# Patient Record
Sex: FEMALE | Race: WHITE | Employment: OTHER | URBAN - METROPOLITAN AREA
[De-identification: names, ages, dates, MRNs, and addresses within clinical notes are randomized per-mention and may not be internally consistent; named-entity substitution may affect disease eponyms.]

---

## 2023-04-01 ENCOUNTER — HOSPITAL ENCOUNTER (OUTPATIENT)
Age: 68
Setting detail: OBSERVATION
Discharge: HOME | End: 2023-04-04
Attending: EMERGENCY MEDICINE | Admitting: INTERNAL MEDICINE
Payer: MEDICARE

## 2023-04-01 ENCOUNTER — APPOINTMENT (OUTPATIENT)
Dept: GENERAL RADIOLOGY | Age: 68
End: 2023-04-01
Payer: MEDICARE

## 2023-04-01 DIAGNOSIS — I77.6 VASCULITIS (HCC): Primary | ICD-10-CM

## 2023-04-01 LAB
ALBUMIN SERPL-MCNC: 3.7 G/DL (ref 3.4–5)
ALBUMIN/GLOB SERPL: 0.9 {RATIO} (ref 1.1–2.2)
ALP SERPL-CCNC: 75 U/L (ref 40–129)
ALT SERPL-CCNC: 11 U/L (ref 10–40)
ANION GAP SERPL CALCULATED.3IONS-SCNC: 12 MMOL/L (ref 3–16)
AST SERPL-CCNC: 16 U/L (ref 15–37)
BASOPHILS # BLD: 0 K/UL (ref 0–0.2)
BASOPHILS NFR BLD: 0.5 %
BILIRUB SERPL-MCNC: 0.3 MG/DL (ref 0–1)
BUN SERPL-MCNC: 17 MG/DL (ref 7–20)
CALCIUM SERPL-MCNC: 9.1 MG/DL (ref 8.3–10.6)
CHLORIDE SERPL-SCNC: 105 MMOL/L (ref 99–110)
CO2 SERPL-SCNC: 22 MMOL/L (ref 21–32)
CREAT SERPL-MCNC: 0.8 MG/DL (ref 0.6–1.2)
DEPRECATED RDW RBC AUTO: 15.5 % (ref 12.4–15.4)
EOSINOPHIL # BLD: 0 K/UL (ref 0–0.6)
EOSINOPHIL NFR BLD: 0.1 %
GFR SERPLBLD CREATININE-BSD FMLA CKD-EPI: >60 ML/MIN/{1.73_M2}
GLUCOSE SERPL-MCNC: 141 MG/DL (ref 70–99)
HCT VFR BLD AUTO: 41.1 % (ref 36–48)
HGB BLD-MCNC: 13.4 G/DL (ref 12–16)
INR PPP: 0.94 (ref 0.84–1.16)
LACTATE BLDV-SCNC: 1.5 MMOL/L (ref 0.4–1.9)
LYMPHOCYTES # BLD: 0.7 K/UL (ref 1–5.1)
LYMPHOCYTES NFR BLD: 6.9 %
MCH RBC QN AUTO: 28.1 PG (ref 26–34)
MCHC RBC AUTO-ENTMCNC: 32.6 G/DL (ref 31–36)
MCV RBC AUTO: 86.1 FL (ref 80–100)
MONOCYTES # BLD: 0.1 K/UL (ref 0–1.3)
MONOCYTES NFR BLD: 1.1 %
NEUTROPHILS # BLD: 8.9 K/UL (ref 1.7–7.7)
NEUTROPHILS NFR BLD: 91.4 %
PLATELET # BLD AUTO: 179 K/UL (ref 135–450)
PMV BLD AUTO: 9.5 FL (ref 5–10.5)
POTASSIUM SERPL-SCNC: 4.2 MMOL/L (ref 3.5–5.1)
PROT SERPL-MCNC: 7.6 G/DL (ref 6.4–8.2)
PROTHROMBIN TIME: 12.6 SEC (ref 11.5–14.8)
RBC # BLD AUTO: 4.77 M/UL (ref 4–5.2)
SODIUM SERPL-SCNC: 139 MMOL/L (ref 136–145)
WBC # BLD AUTO: 9.7 K/UL (ref 4–11)

## 2023-04-01 PROCEDURE — 80053 COMPREHEN METABOLIC PANEL: CPT

## 2023-04-01 PROCEDURE — 85025 COMPLETE CBC W/AUTO DIFF WBC: CPT

## 2023-04-01 PROCEDURE — 99285 EMERGENCY DEPT VISIT HI MDM: CPT

## 2023-04-01 PROCEDURE — 96365 THER/PROPH/DIAG IV INF INIT: CPT

## 2023-04-01 PROCEDURE — 6360000002 HC RX W HCPCS: Performed by: PHYSICIAN ASSISTANT

## 2023-04-01 PROCEDURE — 85652 RBC SED RATE AUTOMATED: CPT

## 2023-04-01 PROCEDURE — 2580000003 HC RX 258: Performed by: PHYSICIAN ASSISTANT

## 2023-04-01 PROCEDURE — 83605 ASSAY OF LACTIC ACID: CPT

## 2023-04-01 PROCEDURE — 96375 TX/PRO/DX INJ NEW DRUG ADDON: CPT

## 2023-04-01 PROCEDURE — 87040 BLOOD CULTURE FOR BACTERIA: CPT

## 2023-04-01 PROCEDURE — 86140 C-REACTIVE PROTEIN: CPT

## 2023-04-01 PROCEDURE — 73590 X-RAY EXAM OF LOWER LEG: CPT

## 2023-04-01 PROCEDURE — 85610 PROTHROMBIN TIME: CPT

## 2023-04-01 RX ORDER — METHYLPREDNISOLONE SODIUM SUCCINATE 125 MG/2ML
125 INJECTION, POWDER, LYOPHILIZED, FOR SOLUTION INTRAMUSCULAR; INTRAVENOUS ONCE
Status: COMPLETED | OUTPATIENT
Start: 2023-04-01 | End: 2023-04-01

## 2023-04-01 RX ADMIN — PIPERACILLIN AND TAZOBACTAM 4500 MG: 4; .5 INJECTION, POWDER, FOR SOLUTION INTRAVENOUS at 23:29

## 2023-04-01 RX ADMIN — METHYLPREDNISOLONE SODIUM SUCCINATE 125 MG: 125 INJECTION, POWDER, FOR SOLUTION INTRAMUSCULAR; INTRAVENOUS at 23:50

## 2023-04-01 ASSESSMENT — ENCOUNTER SYMPTOMS
CONSTIPATION: 0
BACK PAIN: 0
SORE THROAT: 0
SHORTNESS OF BREATH: 0
EYE REDNESS: 0
DIARRHEA: 0
COUGH: 0
NAUSEA: 0
VOMITING: 0
ABDOMINAL PAIN: 0
EYE PAIN: 0

## 2023-04-01 ASSESSMENT — LIFESTYLE VARIABLES
HOW MANY STANDARD DRINKS CONTAINING ALCOHOL DO YOU HAVE ON A TYPICAL DAY: PATIENT DOES NOT DRINK
HOW OFTEN DO YOU HAVE A DRINK CONTAINING ALCOHOL: NEVER

## 2023-04-01 ASSESSMENT — PAIN - FUNCTIONAL ASSESSMENT: PAIN_FUNCTIONAL_ASSESSMENT: 0-10

## 2023-04-01 ASSESSMENT — PAIN DESCRIPTION - PAIN TYPE: TYPE: ACUTE PAIN

## 2023-04-01 ASSESSMENT — PAIN DESCRIPTION - LOCATION: LOCATION: LEG

## 2023-04-01 ASSESSMENT — PAIN DESCRIPTION - ORIENTATION: ORIENTATION: RIGHT;LEFT

## 2023-04-01 ASSESSMENT — PAIN SCALES - GENERAL: PAINLEVEL_OUTOF10: 8

## 2023-04-02 LAB
CRP SERPL-MCNC: 11.1 MG/L (ref 0–5.1)
ERYTHROCYTE [SEDIMENTATION RATE] IN BLOOD BY WESTERGREN METHOD: 58 MM/HR (ref 0–30)

## 2023-04-02 PROCEDURE — 96366 THER/PROPH/DIAG IV INF ADDON: CPT

## 2023-04-02 PROCEDURE — 6360000002 HC RX W HCPCS: Performed by: PHYSICIAN ASSISTANT

## 2023-04-02 PROCEDURE — 96368 THER/DIAG CONCURRENT INF: CPT

## 2023-04-02 PROCEDURE — 2580000003 HC RX 258: Performed by: PHYSICIAN ASSISTANT

## 2023-04-02 RX ADMIN — VANCOMYCIN HYDROCHLORIDE 1500 MG: 1.5 INJECTION, POWDER, LYOPHILIZED, FOR SOLUTION INTRAVENOUS at 00:30

## 2023-04-02 NOTE — ED NOTES
Son at bedside. Poc discussed with pt and son, son translated for pt. Pt able to communicate with little english.      Emil Mcdermott RN  04/02/23 4754

## 2023-04-02 NOTE — ED NOTES
Providers attempting to admit pt inhouse until transfer made possible. Awaiting decision.       Collins Queen RN  04/02/23 3936

## 2023-04-02 NOTE — ED NOTES
Looked in the patients encounter and saw that it states that UC is still waiting for a bed to become available.       [facility] RAQUEL  04/02/23 2281

## 2023-04-02 NOTE — ED TRIAGE NOTES
Pt arrived in department for a c/c of leg pain. Pt states that she feels that her skin is getting tighter in both legs. Pt states that left leg is worse. Pt states that this started today. Pt states that she feels that she has an infection in both her legs. No specific diagnosis at this time. Pt rates pain 8/10 currently. VS noted and stable except bp (149/85). Patient A&Ox4. Respirations easy and unlabored. Skin warm and dry and appropriate for ethnicity. No acute distress noted at this time. Patient placed on continuous telemetry and pulse ox upon arrival to room.

## 2023-04-02 NOTE — ED NOTES
Pt spo2 dropped to 86% while sleeping.  She is placed on 1L o2 nc.     Stan Briggs, ZEE  04/02/23 1600

## 2023-04-02 NOTE — ED NOTES
Placed call to access center for a UC update. Spoke with Silva RN, she reached out to . Their response was that they only had half the discharges they thought they were going to have. They are still working on a bed for the patient in hopes of having one tonight but they can not guarantee that. RN and charge RN made aware.       [facility] RAQUEL  04/02/23 5769

## 2023-04-02 NOTE — ED NOTES
Placed call to access center for a update, as there was no new update in there since 0900 this morning. Spoke with RN at the center and she reached out to RN at [facility] and she states that they are still working on a bed for this patient. Any new update they rika call us back. RN and Charge RN made aware.       [facility]  04/02/23 9489

## 2023-04-02 NOTE — ED NOTES
Pt provided with another pillow and blanket for comfort. Updated on poc. Awaiting room and transport info for pt transfer. Pt trying to take a nap.       Stan Briggs RN  04/02/23 4027

## 2023-04-02 NOTE — ED NOTES
Son left for work, number for contact and to update on transport at bedside.      Emil Mcdermott, ZEE  04/02/23 5447

## 2023-04-03 PROBLEM — M79.606 LEG PAIN: Status: ACTIVE | Noted: 2023-04-03

## 2023-04-03 LAB
ANION GAP SERPL CALCULATED.3IONS-SCNC: 11 MMOL/L (ref 3–16)
BUN SERPL-MCNC: 20 MG/DL (ref 7–20)
CALCIUM SERPL-MCNC: 8.5 MG/DL (ref 8.3–10.6)
CHLORIDE SERPL-SCNC: 108 MMOL/L (ref 99–110)
CO2 SERPL-SCNC: 24 MMOL/L (ref 21–32)
CREAT SERPL-MCNC: 1.1 MG/DL (ref 0.6–1.2)
CRP SERPL-MCNC: 5.9 MG/L (ref 0–5.1)
DEPRECATED RDW RBC AUTO: 16 % (ref 12.4–15.4)
ERYTHROCYTE [SEDIMENTATION RATE] IN BLOOD BY WESTERGREN METHOD: 48 MM/HR (ref 0–30)
GFR SERPLBLD CREATININE-BSD FMLA CKD-EPI: 56 ML/MIN/{1.73_M2}
GLUCOSE SERPL-MCNC: 89 MG/DL (ref 70–99)
HCT VFR BLD AUTO: 42.1 % (ref 36–48)
HGB BLD-MCNC: 13.5 G/DL (ref 12–16)
MAGNESIUM SERPL-MCNC: 2.1 MG/DL (ref 1.8–2.4)
MCH RBC QN AUTO: 28 PG (ref 26–34)
MCHC RBC AUTO-ENTMCNC: 32 G/DL (ref 31–36)
MCV RBC AUTO: 87.6 FL (ref 80–100)
PLATELET # BLD AUTO: 181 K/UL (ref 135–450)
PMV BLD AUTO: 10.2 FL (ref 5–10.5)
POTASSIUM SERPL-SCNC: 3.5 MMOL/L (ref 3.5–5.1)
RBC # BLD AUTO: 4.8 M/UL (ref 4–5.2)
SODIUM SERPL-SCNC: 143 MMOL/L (ref 136–145)
WBC # BLD AUTO: 10.3 K/UL (ref 4–11)

## 2023-04-03 PROCEDURE — 6360000002 HC RX W HCPCS: Performed by: INTERNAL MEDICINE

## 2023-04-03 PROCEDURE — G0378 HOSPITAL OBSERVATION PER HR: HCPCS

## 2023-04-03 PROCEDURE — 6370000000 HC RX 637 (ALT 250 FOR IP): Performed by: INTERNAL MEDICINE

## 2023-04-03 PROCEDURE — 83735 ASSAY OF MAGNESIUM: CPT

## 2023-04-03 PROCEDURE — 80048 BASIC METABOLIC PNL TOTAL CA: CPT

## 2023-04-03 PROCEDURE — 96372 THER/PROPH/DIAG INJ SC/IM: CPT

## 2023-04-03 PROCEDURE — 85027 COMPLETE CBC AUTOMATED: CPT

## 2023-04-03 PROCEDURE — 2580000003 HC RX 258: Performed by: INTERNAL MEDICINE

## 2023-04-03 PROCEDURE — 85652 RBC SED RATE AUTOMATED: CPT

## 2023-04-03 PROCEDURE — 86140 C-REACTIVE PROTEIN: CPT

## 2023-04-03 PROCEDURE — 36415 COLL VENOUS BLD VENIPUNCTURE: CPT

## 2023-04-03 RX ORDER — ACETAMINOPHEN 325 MG/1
650 TABLET ORAL EVERY 6 HOURS PRN
Status: DISCONTINUED | OUTPATIENT
Start: 2023-04-03 | End: 2023-04-04 | Stop reason: HOSPADM

## 2023-04-03 RX ORDER — SODIUM CHLORIDE 0.9 % (FLUSH) 0.9 %
10 SYRINGE (ML) INJECTION PRN
Status: DISCONTINUED | OUTPATIENT
Start: 2023-04-03 | End: 2023-04-04 | Stop reason: HOSPADM

## 2023-04-03 RX ORDER — MAGNESIUM SULFATE IN WATER 40 MG/ML
2000 INJECTION, SOLUTION INTRAVENOUS PRN
Status: DISCONTINUED | OUTPATIENT
Start: 2023-04-03 | End: 2023-04-04 | Stop reason: HOSPADM

## 2023-04-03 RX ORDER — POTASSIUM CHLORIDE 20 MEQ/1
40 TABLET, EXTENDED RELEASE ORAL PRN
Status: DISCONTINUED | OUTPATIENT
Start: 2023-04-03 | End: 2023-04-04 | Stop reason: HOSPADM

## 2023-04-03 RX ORDER — SODIUM CHLORIDE 9 MG/ML
INJECTION, SOLUTION INTRAVENOUS PRN
Status: DISCONTINUED | OUTPATIENT
Start: 2023-04-03 | End: 2023-04-04 | Stop reason: HOSPADM

## 2023-04-03 RX ORDER — PROMETHAZINE HYDROCHLORIDE 25 MG/1
12.5 TABLET ORAL EVERY 6 HOURS PRN
Status: DISCONTINUED | OUTPATIENT
Start: 2023-04-03 | End: 2023-04-04 | Stop reason: HOSPADM

## 2023-04-03 RX ORDER — POTASSIUM CHLORIDE 7.45 MG/ML
10 INJECTION INTRAVENOUS PRN
Status: DISCONTINUED | OUTPATIENT
Start: 2023-04-03 | End: 2023-04-04 | Stop reason: HOSPADM

## 2023-04-03 RX ORDER — ENOXAPARIN SODIUM 100 MG/ML
40 INJECTION SUBCUTANEOUS DAILY
Status: DISCONTINUED | OUTPATIENT
Start: 2023-04-03 | End: 2023-04-04 | Stop reason: HOSPADM

## 2023-04-03 RX ORDER — HYDROCODONE BITARTRATE AND ACETAMINOPHEN 7.5; 325 MG/1; MG/1
1 TABLET ORAL EVERY 6 HOURS PRN
Status: DISCONTINUED | OUTPATIENT
Start: 2023-04-03 | End: 2023-04-04 | Stop reason: HOSPADM

## 2023-04-03 RX ORDER — ONDANSETRON 2 MG/ML
4 INJECTION INTRAMUSCULAR; INTRAVENOUS EVERY 6 HOURS PRN
Status: DISCONTINUED | OUTPATIENT
Start: 2023-04-03 | End: 2023-04-04 | Stop reason: HOSPADM

## 2023-04-03 RX ORDER — SODIUM CHLORIDE 0.9 % (FLUSH) 0.9 %
10 SYRINGE (ML) INJECTION EVERY 12 HOURS SCHEDULED
Status: DISCONTINUED | OUTPATIENT
Start: 2023-04-03 | End: 2023-04-04 | Stop reason: HOSPADM

## 2023-04-03 RX ORDER — ACETAMINOPHEN 650 MG/1
650 SUPPOSITORY RECTAL EVERY 6 HOURS PRN
Status: DISCONTINUED | OUTPATIENT
Start: 2023-04-03 | End: 2023-04-04 | Stop reason: HOSPADM

## 2023-04-03 RX ADMIN — ENOXAPARIN SODIUM 40 MG: 100 INJECTION SUBCUTANEOUS at 18:35

## 2023-04-03 RX ADMIN — SODIUM CHLORIDE, PRESERVATIVE FREE 10 ML: 5 INJECTION INTRAVENOUS at 21:25

## 2023-04-03 RX ADMIN — HYDROCODONE BITARTRATE AND ACETAMINOPHEN 1 TABLET: 7.5; 325 TABLET ORAL at 18:35

## 2023-04-03 ASSESSMENT — PAIN DESCRIPTION - DESCRIPTORS: DESCRIPTORS: DISCOMFORT

## 2023-04-03 ASSESSMENT — PAIN SCALES - WONG BAKER: WONGBAKER_NUMERICALRESPONSE: 0

## 2023-04-03 ASSESSMENT — PAIN SCALES - GENERAL
PAINLEVEL_OUTOF10: 5
PAINLEVEL_OUTOF10: 0

## 2023-04-03 ASSESSMENT — PAIN - FUNCTIONAL ASSESSMENT: PAIN_FUNCTIONAL_ASSESSMENT: ACTIVITIES ARE NOT PREVENTED

## 2023-04-03 ASSESSMENT — PAIN DESCRIPTION - LOCATION: LOCATION: LEG

## 2023-04-03 ASSESSMENT — PAIN DESCRIPTION - ORIENTATION: ORIENTATION: LEFT

## 2023-04-03 NOTE — PLAN OF CARE
Problem: Discharge Planning  Goal: Discharge to home or other facility with appropriate resources  Outcome: Progressing  Flowsheets (Taken 4/3/2023 1814)  Discharge to home or other facility with appropriate resources:   Identify barriers to discharge with patient and caregiver   Arrange for needed discharge resources and transportation as appropriate   Identify discharge learning needs (meds, wound care, etc)   Arrange for interpreters to assist at discharge as needed     Problem: Pain  Goal: Verbalizes/displays adequate comfort level or baseline comfort level  Outcome: Progressing     Problem: ABCDS Injury Assessment  Goal: Absence of physical injury  Outcome: Progressing

## 2023-04-03 NOTE — ED NOTES
Patient is comfortable visiting with family. No distress noted; still waiting on decision.      Rula Kenny RN  04/02/23 4815

## 2023-04-03 NOTE — ED NOTES
Patient resting comfortable on left side, pillow under head and no pain at this time     Gabriele Gee RN  04/03/23 0858

## 2023-04-03 NOTE — CARE COORDINATION
SW confirmed PCP-  [facility]   Velia Elise NP   Phone # [phone]  Fax # [phone]    Main # for [facility] [phone]

## 2023-04-03 NOTE — ED PROVIDER NOTES
2230: Patient's status. We have checked multiple times throughout the afternoon and evening. Still no bed availability at the [facility]. She has been accepted as documented in her ED chart. They had less than half of the anticipated discharges and still have no bed and likely will not have a bed tonight. I did speak with Dr. Komal Horan on 6:30 PM.  He requested that we wait a few more hours and reassess her bed status and if still no bed available we could talk with the night shift hospitalist.  At 24 hours she still did not have a bed available. I consulted the night shift hospitalist, Dr. Flaco Rosa. He still felt that the patient required transfer to , he felt that we could not provide the services she needs here, rheumatology. He felt that admitting her here would delay getting a bed at [facility]. I have made the medical director, Dr. Roe Meier aware of the ongoing hold in the ED while waiting for a bed to be available at [facility]. The patient was reassessed using an  and has no needs at this time. Her vital signs are stable. Leilani Dennison MD  04/02/23 2232      1150: Still no bed anticipated at  anytime this afternoon. I was contacted by our medical director, Dr. Roe Meier. He has been in communication with the hospitalist, Dr. Komal Horan. Per his request I will Perfect[employer]erve the hospitalist currently on duty to admit the patient at our facility pending bed availability at the [facility].      Leilani Dennison MD  04/03/23 7178
Seen and examined discussed with MLP agree with plan. Briefly this is a [age]year-old  female who comes in with a day history of progressive left lower extremity edema and rash. She is afebrile she has normal vital signs however she has pain and a nonblanchable petechial rash in the left lower extremity. She has good pulses. I am concerned with a cellulitis type process.      Rick Díaz MD  04/01/23 0822
heart sounds. No murmur heard. No gallop. Pulmonary:      Effort: Pulmonary effort is normal. No respiratory distress. Breath sounds: Normal breath sounds. No stridor. No wheezing, rhonchi or rales. Musculoskeletal:         General: Normal range of motion. Cervical back: Normal range of motion. Comments: DP pulses 2+ bilaterally     Skin:     General: Skin is warm and dry. Findings: Petechiae and rash present. Rash is purpuric. Comments: No blanching   Neurological:      General: No focal deficit present. Mental Status: She is alert and oriented to person, place, and time. Psychiatric:         Mood and Affect: Mood normal.         Behavior: Behavior normal.                 DIAGNOSTIC RESULTS   LABS:    Labs Reviewed   CBC WITH AUTO DIFFERENTIAL - Abnormal; Notable for the following components:       Result Value    RDW 15.5 (*)     Neutrophils Absolute 8.9 (*)     Lymphocytes Absolute 0.7 (*)     All other components within normal limits   COMPREHENSIVE METABOLIC PANEL W/ REFLEX TO MG FOR LOW K - Abnormal; Notable for the following components:    Glucose 141 (*)     Albumin/Globulin Ratio 0.9 (*)     All other components within normal limits   C-REACTIVE PROTEIN - Abnormal; Notable for the following components:    CRP 11.1 (*)     All other components within normal limits   SEDIMENTATION RATE - Abnormal; Notable for the following components:    Sed Rate 58 (*)     All other components within normal limits   CULTURE, BLOOD 1   CULTURE, BLOOD 2   LACTATE, SEPSIS   PROTIME-INR   LACTATE, SEPSIS       When ordered only abnormal lab results are displayed. All other labs were within normal range or not returned as of this dictation. EKG: When ordered, EKG's are interpreted by the Emergency Department Physician in the absence of a cardiologist.  Please see their note for interpretation of EKG.     RADIOLOGY:   Non-plain film images such as CT, Ultrasound and MRI are read by the

## 2023-04-03 NOTE — ED NOTES
Patient resting comfortable. No distress noted.  Unlabored breathin,bed in lowest position, and call light within reach     Les Holt RN  04/03/23 6089

## 2023-04-03 NOTE — ED NOTES
Pt's family has arrived with food and drinks. Requesting an update on care plan.           Meg Rodriguez RN  04/03/23 6709

## 2023-04-03 NOTE — CARE COORDINATION
Case Management Assessment  Initial Evaluation    Date/Time of Evaluation: 4/3/2023 1:40 PM  Assessment Completed by: TANNER Fischer    If patient is discharged prior to next notation, then this note serves as note for discharge by case management. Patient Name: Sivakumar Lizarraga                   YOB: 1955  Diagnosis: No admission diagnoses are documented for this encounter. Date / Time: 4/1/2023  9:31 PM    Patient Admission Status: Emergency   Readmission Risk (Low < 19, Mod (19-27), High > 27): No data recorded  Current PCP: DEYANIRA Frye CNP  PCP verified by CM? Yes    Chart Reviewed: Yes      History Provided by: Patient, Child/Family  Patient Orientation: Alert and Oriented    Patient Cognition: Alert    Hospitalization in the last 30 days (Readmission):  No    If yes, Readmission Assessment in CM Navigator will be completed. Advance Directives:      Code Status: Not on file   Patient's Primary Decision Maker is: Legal Next of Kin    Primary Decision Maker: Ciarra Aragon - Child - [phone]    Secondary Decision Maker: Anna Branch - Child - [phone]    Discharge Planning:    Patient lives with: Children Type of Home:    Primary Care Giver: Self  Patient Support Systems include: Family Members   Current Financial resources:    Current community resources:    Current services prior to admission:              Current DME:              Type of Home Care services:       ADLS  Prior functional level: Independent in ADLs/IADLs  Current functional level: Independent in ADLs/IADLs    PT AM-PAC:   /24  OT AM-PAC:   /24    Family can provide assistance at DC: Yes  Would you like Case Management to discuss the discharge plan with any other family members/significant others, and if so, who?  Yes (son or daughter)  Plans to Return to Present Housing: Yes  Other Identified Issues/Barriers to RETURNING to current housing: None  Potential Assistance needed

## 2023-04-03 NOTE — ACP (ADVANCE CARE PLANNING)
Advance Care Planning     Advance Care Planning Activator (Inpatient)  Conversation Note      Date of ACP Conversation: 4/3/2023     Conversation Conducted with: Patient with Decision Making Capacity    ACP Activator: Nuzhat Yamileth, [facility] Maker:     Current Designated Health Care Decision Maker:     Primary Decision Maker: Erica Sotelo Child - [phone]    Secondary Decision Maker: Joshua Constantino Child - [phone]    Today we documented Decision Maker(s) consistent with Legal Next of Kin hierarchy. Care Preferences    Ventilation: \"If you were in your present state of health and suddenly became very ill and were unable to breathe on your own, what would your preference be about the use of a ventilator (breathing machine) if it were available to you? \"      Would the patient desire the use of ventilator (breathing machine)?: yes    \"If your health worsens and it becomes clear that your chance of recovery is unlikely, what would your preference be about the use of a ventilator (breathing machine) if it were available to you? \"     Would the patient desire the use of ventilator (breathing machine)?: No      Resuscitation  \"CPR works best to restart the heart when there is a sudden event, like a heart attack, in someone who is otherwise healthy. Unfortunately, CPR does not typically restart the heart for people who have serious health conditions or who are very sick. \"    \"In the event your heart stopped as a result of an underlying serious health condition, would you want attempts to be made to restart your heart (answer \"yes\" for attempt to resuscitate) or would you prefer a natural death (answer \"no\" for do not attempt to resuscitate)? \" yes       [] Yes   [] No   Educated Patient / Willye Pair regarding differences between Advance Directives and portable DNR orders.     Length of ACP Conversation in minutes:      Conversation Outcomes:  ACP discussion

## 2023-04-03 NOTE — H&P
Hospital Medicine History & Physical      PCP: DEYANIRA Frye - CNP    Date of Admission: 4/1/2023    Chief Complaint:    Chief Complaint   Patient presents with    Other     Pt states that she feels that her skin is getting tighter in both legs. Pt states that left leg is worse. Pt states that this started today. Pt states that she feels that she has an infection in both her legs. No specific diagnosis at this time,        History Of Present Illness:    Patient is a 47-year-old female who presented to hospital for left lower extremity rash and swelling. Patient mentions that this is painless however it was getting worse, she also has as noticed it is started all of a sudden, It was getting it was getting worse, she was concerned she was concerned if if it was infection. Patient denied fever chills diarrhea constipation dysuria. Past Medical History:          Diagnosis Date    IFG (impaired fasting glucose)     Shoulder pain, right     Varicose vein of leg 7/23/2015       Past Surgical History:      History reviewed. No pertinent surgical history. Medications Prior to Admission:      Prior to Admission medications    Not on File       Allergies:  Patient has no known allergies. Social History:      TOBACCO:   reports that she has never smoked. She does not have any smokeless tobacco history on file. ETOH:   reports no history of alcohol use. Family History:       Reviewed in detail and non contributory      History reviewed. No pertinent family history. REVIEW OF SYSTEMS:   Pertinent positives as noted in the HPI. All other systems reviewed and negative. PHYSICAL EXAM PERFORMED:    /60   Pulse 66   Temp 98.2 °F (36.8 °C) (Oral)   Resp 19   Ht 5' 5\" (1.651 m)   Wt 195 lb 5.2 oz (88.6 kg)   SpO2 96%   BMI 32.50 kg/m²     General appearance:  No apparent distress, cooperative. HEENT:  Normal cephalic, atraumatic without obvious deformity.  Conjunctivae/corneas

## 2023-04-03 NOTE — ED NOTES
Kallie Lee from [facility] just called to update that UC is still discharge dependent and looking for bed placement. They were unable to give her an ETA of when this patient will likely get a bed.       Nicola Arguello, ZEE  04/02/23 7908

## 2023-04-03 NOTE — ED NOTES
This RN to bedside to update patient via [place] language interpret. Patient declines being moved into a regular hospital at this time. She says she would like to stay in the stretcher overnight and is hoping that a bed becomes available early tomorrow. Patient instructed to use her call light if she needs anything throughout the night. Patient verbalized understanding.       Carol Rayo RN  04/02/23 4630

## 2023-04-04 VITALS
HEART RATE: 80 BPM | OXYGEN SATURATION: 97 % | BODY MASS INDEX: 36.22 KG/M2 | SYSTOLIC BLOOD PRESSURE: 146 MMHG | DIASTOLIC BLOOD PRESSURE: 90 MMHG | WEIGHT: 217.37 LBS | HEIGHT: 65 IN | RESPIRATION RATE: 15 BRPM | TEMPERATURE: 97.2 F

## 2023-04-04 LAB
BACTERIA URNS QL MICRO: ABNORMAL /HPF
BASOPHILS # BLD: 0.1 K/UL (ref 0–0.2)
BASOPHILS NFR BLD: 1 %
BILIRUB UR QL STRIP.AUTO: NEGATIVE
CLARITY UR: CLEAR
COLOR UR: YELLOW
DEPRECATED RDW RBC AUTO: 15.7 % (ref 12.4–15.4)
EOSINOPHIL # BLD: 0.3 K/UL (ref 0–0.6)
EOSINOPHIL NFR BLD: 3.3 %
EPI CELLS #/AREA URNS AUTO: 3 /HPF (ref 0–5)
GLUCOSE UR STRIP.AUTO-MCNC: NEGATIVE MG/DL
HCT VFR BLD AUTO: 38.5 % (ref 36–48)
HGB BLD-MCNC: 12.7 G/DL (ref 12–16)
HGB UR QL STRIP.AUTO: NEGATIVE
HYALINE CASTS #/AREA URNS AUTO: 0 /LPF (ref 0–8)
KETONES UR STRIP.AUTO-MCNC: NEGATIVE MG/DL
LEUKOCYTE ESTERASE UR QL STRIP.AUTO: ABNORMAL
LYMPHOCYTES # BLD: 3 K/UL (ref 1–5.1)
LYMPHOCYTES NFR BLD: 39 %
MCH RBC QN AUTO: 28.6 PG (ref 26–34)
MCHC RBC AUTO-ENTMCNC: 33.1 G/DL (ref 31–36)
MCV RBC AUTO: 86.4 FL (ref 80–100)
MONOCYTES # BLD: 0.6 K/UL (ref 0–1.3)
MONOCYTES NFR BLD: 7.5 %
NEUTROPHILS # BLD: 3.8 K/UL (ref 1.7–7.7)
NEUTROPHILS NFR BLD: 49.2 %
NITRITE UR QL STRIP.AUTO: POSITIVE
PH UR STRIP.AUTO: 5.5 [PH] (ref 5–8)
PLATELET # BLD AUTO: 175 K/UL (ref 135–450)
PMV BLD AUTO: 9.7 FL (ref 5–10.5)
PROT UR STRIP.AUTO-MCNC: NEGATIVE MG/DL
RBC # BLD AUTO: 4.45 M/UL (ref 4–5.2)
RBC CLUMPS #/AREA URNS AUTO: 1 /HPF (ref 0–4)
SP GR UR STRIP.AUTO: 1.02 (ref 1–1.03)
UA DIPSTICK W REFLEX MICRO PNL UR: YES
URN SPEC COLLECT METH UR: ABNORMAL
UROBILINOGEN UR STRIP-ACNC: 0.2 E.U./DL
WBC # BLD AUTO: 7.7 K/UL (ref 4–11)
WBC #/AREA URNS AUTO: 18 /HPF (ref 0–5)

## 2023-04-04 PROCEDURE — 87088 URINE BACTERIA CULTURE: CPT

## 2023-04-04 PROCEDURE — 97165 OT EVAL LOW COMPLEX 30 MIN: CPT

## 2023-04-04 PROCEDURE — 99221 1ST HOSP IP/OBS SF/LOW 40: CPT | Performed by: STUDENT IN AN ORGANIZED HEALTH CARE EDUCATION/TRAINING PROGRAM

## 2023-04-04 PROCEDURE — 85025 COMPLETE CBC W/AUTO DIFF WBC: CPT

## 2023-04-04 PROCEDURE — 2580000003 HC RX 258: Performed by: INTERNAL MEDICINE

## 2023-04-04 PROCEDURE — 6360000002 HC RX W HCPCS: Performed by: INTERNAL MEDICINE

## 2023-04-04 PROCEDURE — 81001 URINALYSIS AUTO W/SCOPE: CPT

## 2023-04-04 PROCEDURE — G0378 HOSPITAL OBSERVATION PER HR: HCPCS

## 2023-04-04 PROCEDURE — 93970 EXTREMITY STUDY: CPT

## 2023-04-04 PROCEDURE — 87086 URINE CULTURE/COLONY COUNT: CPT

## 2023-04-04 PROCEDURE — 36415 COLL VENOUS BLD VENIPUNCTURE: CPT

## 2023-04-04 PROCEDURE — 87186 SC STD MICRODIL/AGAR DIL: CPT

## 2023-04-04 PROCEDURE — 6370000000 HC RX 637 (ALT 250 FOR IP): Performed by: INTERNAL MEDICINE

## 2023-04-04 PROCEDURE — 97161 PT EVAL LOW COMPLEX 20 MIN: CPT

## 2023-04-04 PROCEDURE — 94760 N-INVAS EAR/PLS OXIMETRY 1: CPT

## 2023-04-04 PROCEDURE — 96372 THER/PROPH/DIAG INJ SC/IM: CPT

## 2023-04-04 RX ORDER — PREDNISONE 20 MG/1
40 TABLET ORAL DAILY
Qty: 6 TABLET | Refills: 0 | Status: SHIPPED | OUTPATIENT
Start: 2023-04-05 | End: 2023-04-08

## 2023-04-04 RX ORDER — PREDNISONE 20 MG/1
40 TABLET ORAL DAILY
Status: DISCONTINUED | OUTPATIENT
Start: 2023-04-04 | End: 2023-04-04 | Stop reason: HOSPADM

## 2023-04-04 RX ADMIN — ENOXAPARIN SODIUM 40 MG: 100 INJECTION SUBCUTANEOUS at 09:54

## 2023-04-04 RX ADMIN — SODIUM CHLORIDE, PRESERVATIVE FREE 10 ML: 5 INJECTION INTRAVENOUS at 10:12

## 2023-04-04 RX ADMIN — PREDNISONE 40 MG: 20 TABLET ORAL at 16:28

## 2023-04-04 ASSESSMENT — ENCOUNTER SYMPTOMS
RESPIRATORY NEGATIVE: 1
GASTROINTESTINAL NEGATIVE: 1

## 2023-04-04 NOTE — PLAN OF CARE
Problem: Discharge Planning  Goal: Discharge to home or other facility with appropriate resources  4/4/2023 1500 by Nikolas Sanon RN  Outcome: Progressing  4/4/2023 1330 by Nikolas Sanon RN  Outcome: Progressing  Flowsheets (Taken 4/4/2023 0800)  Discharge to home or other facility with appropriate resources:   Identify barriers to discharge with patient and caregiver   Arrange for needed discharge resources and transportation as appropriate   Identify discharge learning needs (meds, wound care, etc)   Arrange for interpreters to assist at discharge as needed     Problem: Pain  Goal: Verbalizes/displays adequate comfort level or baseline comfort level  4/4/2023 1500 by Nikolas Sanon RN  Outcome: Progressing  4/4/2023 1330 by Nikolas Sanon RN  Outcome: Progressing     Problem: ABCDS Injury Assessment  Goal: Absence of physical injury  4/4/2023 1500 by Nikolas Sanon RN  Outcome: Progressing  4/4/2023 1330 by Nikolas Sanon RN  Outcome: Progressing  Flowsheets (Taken 4/4/2023 0800)  Absence of Physical Injury: Implement safety measures based on patient assessment

## 2023-04-04 NOTE — CARE COORDINATION
Case Management Discharge Note     Date / Time of Note: 4/4/2023 5:27 PM                  Patient Name: Hellen Casas     YOB: 1955  Diagnosis: Leg pain [M79.606]  Vasculitis ([address] [I77.6]     Date / Time: 4/1/2023  9:31 PM    Financial:  Payor: [address] [place] [address] / Plan: MEDICARE PART A AND B / Product Type: *No Product type* /      Pharmacy:    [facility]  [id number] [facility], [address] [place] [address] [phone] - F [phone]  [phone] [id number]  Phone: [phone] Fax: [phone]      Assistance purchasing medications?: Potential Assistance Purchasing Medications: No  Assistance provided by Case Management: None at this time    DISCHARGE Disposition: Home- No Services Needed    Home Care:  Home Care ordered at discharge: No    Durable Medical Equipment:  Equipment obtained during hospitalization: None    Home Oxygen and Respiratory Equipment:  Oxygen needed at discharge?: No    Dialysis:  Dialysis patient: No    Referrals made at [facility] for outpatient continued care:  Not Applicable    Transportation:  Transportation PLAN for discharge: family   Mode of Transport: Private Car    IMM Completed: N/A    Additional CM Notes: Call to daughter Freddy Mccollum [phone] to notify of discharge. She will transport patient to home.     The Plan for Transition of Care is related to the following treatment goals of Leg pain [M79.606]  Vasculitis ([address] [I77.6]    Teresita Strickland RN  [address] [address]   Case Management Department  Ph: [phone]    Electronically signed by Teresita Strickland RN on 4/4/2023 at 5:35 PM

## 2023-04-04 NOTE — DISCHARGE INSTR - COC
{CHP DME HBXU:952061746}  Dressing  {CHP DME KSBQ:198537126}  Toileting  {CHP DME RIIT:452070368}  Feeding  {CHP DME UWGU:876230268}  Med Admin  {CHP DME WOGU:797234525}  Med Delivery   { LORETTA MED Delivery:619362805}    Wound Care Documentation and Therapy:        Elimination:  Continence: Bowel: {YES / ZJ:26068}  Bladder: {YES / CO:75730}  Urinary Catheter: {Urinary Catheter:635477765}   Colostomy/Ileostomy/Ileal Conduit: {YES / BZ:14276}       Date of Last BM: ***    Intake/Output Summary (Last 24 hours) at 2023 1659  Last data filed at 2023 0829  Gross per 24 hour   Intake 350 ml   Output --   Net 350 ml     No intake/output data recorded.     Safety Concerns:     [address] [employer] Safety Concerns:877066165}    Impairments/Disabilities:      [address] [employer] Impairments/Disabilities:252366028}    Nutrition Therapy:  Current Nutrition Therapy:   [address] [employer] Diet List:560715765}    Routes of Feeding: {P DME Other Feedings:615809280}  Liquids: {Slp liquid thickness:47147}  Daily Fluid Restriction: {CHP DME Yes amt example:462434533}  Last Modified Barium Swallow with Video (Video Swallowing Test): {Done Not Done LOAJ:829608386}    Treatments at the Time of Hospital Discharge:   Respiratory Treatments: ***  Oxygen Therapy:  {Therapy; copd oxygen:36283}  Ventilator:    { CC Vent BSNC:544406781}    Rehab Therapies: {THERAPEUTIC INTERVENTION:0867286671}  Weight Bearing Status/Restrictions: [employer] Weight Bearin}  Other Medical Equipment (for information only, NOT a DME order):  {EQUIPMENT:378916681}  Other Treatments: ***    Patient's personal belongings (please select all that are sent with patient):  {CHP DME Belongings:974721717}    RN SIGNATURE:  {Esignature:599075050}    CASE MANAGEMENT/SOCIAL WORK SECTION    Inpatient Status Date: ***    Readmission Risk Assessment Score:  Readmission Risk              Risk of Unplanned Readmission:  0           Discharging to Facility/ Agency   Name:

## 2023-04-04 NOTE — PLAN OF CARE
Problem: Discharge Planning  Goal: Discharge to home or other facility with appropriate resources  4/3/2023 2130 by Sabrina Kirk RN  Outcome: Progressing  Flowsheets (Taken 4/3/2023 1849 by Leonela Moralez RN)  Discharge to home or other facility with appropriate resources:   Arrange for needed discharge resources and transportation as appropriate   Identify barriers to discharge with patient and caregiver   Identify discharge learning needs (meds, wound care, etc)   Arrange for interpreters to assist at discharge as needed  4/3/2023 1844 by Leonela Moralez RN  Outcome: Progressing  Flowsheets (Taken 4/3/2023 1814)  Discharge to home or other facility with appropriate resources:   Identify barriers to discharge with patient and caregiver   Arrange for needed discharge resources and transportation as appropriate   Identify discharge learning needs (meds, wound care, etc)   Arrange for interpreters to assist at discharge as needed     Problem: Pain  Goal: Verbalizes/displays adequate comfort level or baseline comfort level  4/3/2023 2130 by Sabrina Kirk RN  Outcome: Progressing  4/3/2023 1844 by Leonela Moralez RN  Outcome: Progressing     Problem: ABCDS Injury Assessment  Goal: Absence of physical injury  4/3/2023 2130 by Sabrina Kirk RN  Outcome: Progressing  4/3/2023 1844 by Leonela Moralez RN  Outcome: Progressing

## 2023-04-04 NOTE — PLAN OF CARE
Problem: Discharge Planning  Goal: Discharge to home or other facility with appropriate resources  4/4/2023 1750 by Mary Ellen Pacheco RN  Outcome: Completed  4/4/2023 1500 by Mary Ellen Pacheco RN  Outcome: Progressing  4/4/2023 1330 by Mary Ellen Pacheco RN  Outcome: Progressing  Flowsheets (Taken 4/4/2023 0800)  Discharge to home or other facility with appropriate resources:   Identify barriers to discharge with patient and caregiver   Arrange for needed discharge resources and transportation as appropriate   Identify discharge learning needs (meds, wound care, etc)   Arrange for interpreters to assist at discharge as needed     Problem: Pain  Goal: Verbalizes/displays adequate comfort level or baseline comfort level  4/4/2023 1750 by Mary Ellen Pacheco RN  Outcome: Completed  4/4/2023 1500 by Mary Ellen Pacheco RN  Outcome: Progressing  4/4/2023 1330 by Mary Ellen Pacheco RN  Outcome: Progressing     Problem: ABCDS Injury Assessment  Goal: Absence of physical injury  4/4/2023 1750 by Mary Ellen Pacheco RN  Outcome: Completed  4/4/2023 1500 by Mary Ellen Pacheco RN  Outcome: Progressing  4/4/2023 1330 by Mary Ellen Pacheco RN  Outcome: Progressing  Flowsheets (Taken 4/4/2023 0800)  Absence of Physical Injury: Implement safety measures based on patient assessment

## 2023-04-04 NOTE — CONSULTS
Clinical Pharmacy Note  Vancomycin Consult    Pharmacy consult received for one-time dose of vancomycin in the Emergency Department per JW Lomax. Ht Readings from Last 1 Encounters:   04/01/23 5' 5\" (1.651 m)        Wt Readings from Last 1 Encounters:   04/01/23 195 lb 5.2 oz (88.6 kg)         Assessment/Plan:  Vancomycin 1500 mg x 1 in ED. If Vancomycin is to continue on admission and pharmacy is to manage dosing, please re-consult with admission orders.     Regis Liang, PharmD  4/1/2023 11:30 PM
Radial: +2/4 palpable  -R DP: +2/4 palpable  -L DP: +2/4 palpable    Wounds:     BLE - Photo taken 4/1/2023      BLE - Photo taken 4/1/2023          Labs  Lab Results   Component Value Date/Time    WBC 7.7 04/04/2023 05:03 AM    HGB 12.7 04/04/2023 05:03 AM    HCT 38.5 04/04/2023 05:03 AM    MCV 86.4 04/04/2023 05:03 AM     04/04/2023 05:03 AM     Lab Results   Component Value Date/Time     04/03/2023 05:51 PM    K 3.5 04/03/2023 05:51 PM     04/03/2023 05:51 PM    CO2 24 04/03/2023 05:51 PM    BUN 20 04/03/2023 05:51 PM    CREATININE 1.1 04/03/2023 05:51 PM      No results found for: GLU    Scheduled Meds:    sodium chloride flush  10 mL IntraVENous 2 times per day    enoxaparin  40 mg SubCUTAneous Daily     Continuous Infusions:    sodium chloride         Imaging:   Left Tib/Fib X-ray - 4/1/2023  Impression  Mild-to-moderate soft tissue swelling with suspected soft tissue edema in the subcutaneous fatty tissues of left leg mainly at the lateral and posterior aspect of left leg. No abnormality on the radiographs of left tibia and fibula. BLE Venous Duplex - Pending    Assessment:  -BLE with areas of skin discoloration - Left Worse than Right  -+2/4 edema to BLE  -Palpable DP bilaterally  -Skin lesions and legs not significantly painful per pt  -Lesions initially spread but have remained stable over the past couple days    Plan:   -No further Vascular Surgery intervention warranted at this time  -Could consider outpatient follow up with Rheumatology    -D/W Hospitalist    All pertinent information and plan of care discussed with Dr. Lilly Kirk. All questions and concerns were addressed with the patient. I have discussed the above stated plan with the patient and the nurse. The patient verbalized understanding and agreed with the plan.     Thank you for allowing to us to participate in the care of Aria Alvarado      Electronically signed by DEYANIRA Davis CNP on 4/4/2023 at

## 2023-04-04 NOTE — PROGRESS NOTES
4 Eyes Skin Assessment     NAME:  Emma Mistry  YOB: 1955  MEDICAL RECORD NUMBER:  [id number]    The patient is being assessed for  Admission    I agree that One RN has performed a thorough Head to Toe Skin Assessment on the patient. ALL assessment sites listed below have been assessed. Areas assessed by both nurses:    Head, Face, Ears, Shoulders, Back, Chest, Arms, Elbows, Hands, Sacrum. Buttock, Coccyx, Ischium, and Legs. Feet and Heels        Does the Patient have a Wound? No noted wound(s) Bruising noted to LLE and right ankle.         Jose Prevention initiated by RN: No   Wound Care Orders initiated by RN: No    Pressure Injury (Stage 3,4, Unstageable, DTI, NWPT, and Complex wounds) if present, place referral order by RN under : No    New and Established Ostomies, if present place, referral order under : No      Nurse 1 eSignature: Electronically signed by Raleigh Leigh RN on 4/3/23 at 6:48 PM EDT    **SHARE this note so that the co-signing nurse can place an eSignature**    Nurse 2 eSignature: Electronically signed by Roxy Blanchard RN on 4/3/23 at 6:52 PM EDT
Discharge instructions and medications reviewed with patient and daughter Alex Latham with . Pt. And daughter Verbalized understanding. Denies questions. Discontinued IV without complications. Pt. tolerated well.
Medication Reconciliation    List of medications patient is currently taking is complete. Source of information: 1. Conversation with patient/family at bedside                                      2. EPIC records      Allergies  Patient has no known allergies. Patient denies routine use of any prescription/OTC medications.      Kris Torres [facility], PharmD, BCPS  4/3/2023 12:15 PM
Patient admitted to room 4124 from ED. Oriented to room, call light, and floor policies. Plan of care reviewed with patient/family. Pt is resting in bed and c/o pain to left leg 5/10 at this time; no s/s of distress noted. Assessment completed; VSS. Safety precautions in place; call light and bedside table within reach. Pt encouraged to call for needs or ambulation. Pt VU. Will continue to monitor.
Physical Therapy  Facility/Department: [facility] SURG  Physical Therapy Initial and Discharge Assessment    Name: Cyn Browne  : 1955  MRN: [id number]  Date of Service: 2023    Discharge Recommendations:  Home with assist PRN, No therapy recommended at discharge   Cyn Browne scored a 24/24 on the AM-PAC short mobility form. At this time, no further PT is recommended upon discharge due to no needs. Recommend patient returns to prior setting with prior services. PT Equipment Recommendations  Equipment Needed: No      Patient Diagnosis(es): The encounter diagnosis was Vasculitis ([facility] [address] Past Medical History:  has a past medical history of IFG (impaired fasting glucose), Shoulder pain, right, and Varicose vein of leg. Past Surgical History:  has no past surgical history on file. Assessment   Assessment: The pt is a 73 yo female who presented to the ED with a rash on her L leg and also R ankle. The pt lives with her dgt and her family. PTA, the pt was indep in ambulation w/o a device, indep in self-care and IADL's. PMHx: R shoulder pain, varicose veins    Today, the pt demonstrated that she is functioning at her baseline and was indep in bed mobility, transfers and was able to ambulate w/o a device in the hallway and completed 12 steps with no rail. Anticipate that the pt will be safe for home with family assist and has no further skilled PT needs. Will sign off.   Therapy Prognosis: Good  Decision Making: Low Complexity  Barriers to Learning: language  No Skilled PT: No PT goals identified  Requires PT Follow-Up: No  Activity Tolerance  Activity Tolerance: Patient tolerated evaluation without incident     Plan   Physcial Therapy Plan  Additional Comments: d/c from acute care PT services  Safety Devices  Type of Devices: Call light within reach, Nurse notified, Left in bed (the pt is up ad joie)     Restrictions  Restrictions/Precautions  Restrictions/Precautions: Up Ad Joie  Position
Pt ambulated independently with all personal belongings. No distress or discomfort noted upon discharge. Daughter Fransisca Figueroa transported pt home.
Sitting Balance: independent  Static Standing Balance: independent  Dynamic Standing Balance: independent    Education Given To: Patient; Family  Education Provided: Role of Therapy  Barriers to Learning:  (language)                          AM-PAC Score        AM-PAC Inpatient Daily Activity Raw Score: 24 (04/04/23 1344)  AM-PAC Inpatient ADL T-Scale Score : 57.54 (04/04/23 1344)  ADL Inpatient CMS 0-100% Score: 0 (04/04/23 1344)  ADL Inpatient CMS G-Code Modifier : CH (04/04/23 1344)        Goals  Short Term Goals  Time Frame for Short Term Goals: no acute OT goals identified.        Therapy Time   Individual Concurrent Group Co-treatment   Time In 1322         Time Out 1342         Minutes 20         Timed Code Treatment Minutes: 0 Minutes       Anjum Bentley OTR/L [id number]

## 2023-04-06 LAB
BACTERIA BLD CULT: NORMAL
BACTERIA UR CULT: ABNORMAL
ORGANISM: ABNORMAL

## 2023-07-10 ENCOUNTER — OFFICE VISIT (OUTPATIENT)
Dept: VASCULAR SURGERY | Age: 68
End: 2023-07-10
Payer: MEDICARE

## 2023-07-10 VITALS — DIASTOLIC BLOOD PRESSURE: 86 MMHG | BODY MASS INDEX: 32.62 KG/M2 | SYSTOLIC BLOOD PRESSURE: 98 MMHG | WEIGHT: 196 LBS

## 2023-07-10 DIAGNOSIS — I83.811 VARICOSE VEINS OF RIGHT LOWER EXTREMITY WITH PAIN: Primary | ICD-10-CM

## 2023-07-10 PROCEDURE — 1090F PRES/ABSN URINE INCON ASSESS: CPT | Performed by: STUDENT IN AN ORGANIZED HEALTH CARE EDUCATION/TRAINING PROGRAM

## 2023-07-10 PROCEDURE — 1036F TOBACCO NON-USER: CPT | Performed by: STUDENT IN AN ORGANIZED HEALTH CARE EDUCATION/TRAINING PROGRAM

## 2023-07-10 PROCEDURE — G8400 PT W/DXA NO RESULTS DOC: HCPCS | Performed by: STUDENT IN AN ORGANIZED HEALTH CARE EDUCATION/TRAINING PROGRAM

## 2023-07-10 PROCEDURE — G8417 CALC BMI ABV UP PARAM F/U: HCPCS | Performed by: STUDENT IN AN ORGANIZED HEALTH CARE EDUCATION/TRAINING PROGRAM

## 2023-07-10 PROCEDURE — G8427 DOCREV CUR MEDS BY ELIG CLIN: HCPCS | Performed by: STUDENT IN AN ORGANIZED HEALTH CARE EDUCATION/TRAINING PROGRAM

## 2023-07-10 PROCEDURE — 1123F ACP DISCUSS/DSCN MKR DOCD: CPT | Performed by: STUDENT IN AN ORGANIZED HEALTH CARE EDUCATION/TRAINING PROGRAM

## 2023-07-10 PROCEDURE — 99203 OFFICE O/P NEW LOW 30 MIN: CPT | Performed by: STUDENT IN AN ORGANIZED HEALTH CARE EDUCATION/TRAINING PROGRAM

## 2023-07-10 PROCEDURE — 3017F COLORECTAL CA SCREEN DOC REV: CPT | Performed by: STUDENT IN AN ORGANIZED HEALTH CARE EDUCATION/TRAINING PROGRAM

## 2023-07-10 NOTE — PROGRESS NOTES
Yari Peres (:  1955) is a 74 y.o. female,New patient, here for evaluation of the following chief complaint(s):  New Patient (Ref by Yan French for varicose veins. Pt's daughter will be translating today, however patient finds that her right leg is worse than the left with pain, swelling, tenderness. )         ASSESSMENT/PLAN:  1. Varicose veins of right lower extremity with pain    This is a 74year old female patient who presents to the office to discuss symptomatic varicose veins. Will need to obtain reflux study as a venous study suggested absent right greater saphenous vein to see if there is a contributing trunk vein that would require ablation. Otherwise continue compression therapy. Will follow results of ultrasound. Subjective   SUBJECTIVE/OBJECTIVE:  This is a 74year old female patient who presents to the office today to discuss right leg pain with ambulation. She speaks [place] and so a family member was present to help with the conversation. Pain is mostly with walking and daily activity. No worsening in the evening. Has apparently had ablative therapies in the past at a different facility. She has tried compression therapy with no improvement. Objective   Physical Exam  Musculoskeletal:      Right lower leg: No edema. Skin:     General: Skin is warm and dry. Comments: Serpiginous vein cluster traveling along anterior shin   Neurological:      Mental Status: She is alert.           Donta Grimm DO, FACS, FSVS, [address] Vascular and Endovascular Surgery

## 2023-08-15 ENCOUNTER — PROCEDURE VISIT (OUTPATIENT)
Dept: SURGERY | Age: 68
End: 2023-08-15
Payer: MEDICARE

## 2023-08-15 DIAGNOSIS — M79.604 PAIN OF RIGHT LOWER EXTREMITY: Primary | ICD-10-CM

## 2023-08-15 DIAGNOSIS — M79.89 LEG SWELLING: ICD-10-CM

## 2023-08-15 PROCEDURE — 93971 EXTREMITY STUDY: CPT | Performed by: SURGERY

## 2023-08-18 ENCOUNTER — TELEPHONE (OUTPATIENT)
Dept: VASCULAR SURGERY | Age: 68
End: 2023-08-18

## 2023-08-21 NOTE — TELEPHONE ENCOUNTER
PT's daughter states that no one has returned her phone call regarding the results of the scan the PT had done. PT's daughter would like to have a return call today.

## 2023-08-22 NOTE — TELEPHONE ENCOUNTER
Discussed options with daughter. Will initiate PA with Medicare. Understands timeline for surgery because of Dr Benny Gosselin from [facility].

## 2024-01-22 ENCOUNTER — HOSPITAL ENCOUNTER (INPATIENT)
Age: 69
LOS: 10 days | Discharge: HOME | End: 2024-02-01
Attending: SURGERY | Admitting: INTERNAL MEDICINE
Payer: MEDICARE

## 2024-01-22 ENCOUNTER — APPOINTMENT (OUTPATIENT)
Dept: GENERAL RADIOLOGY | Age: 69
End: 2024-01-22
Payer: MEDICARE

## 2024-01-22 ENCOUNTER — HOSPITAL ENCOUNTER (EMERGENCY)
Age: 69
Discharge: ANOTHER HOSPITAL | End: 2024-01-22
Attending: EMERGENCY MEDICINE
Payer: MEDICARE

## 2024-01-22 ENCOUNTER — APPOINTMENT (OUTPATIENT)
Dept: CT IMAGING | Age: 69
End: 2024-01-22
Payer: MEDICARE

## 2024-01-22 VITALS
SYSTOLIC BLOOD PRESSURE: 99 MMHG | HEART RATE: 77 BPM | TEMPERATURE: 98.7 F | BODY MASS INDEX: 31.99 KG/M2 | RESPIRATION RATE: 18 BRPM | WEIGHT: 192.02 LBS | OXYGEN SATURATION: 98 % | DIASTOLIC BLOOD PRESSURE: 73 MMHG | HEIGHT: 65 IN

## 2024-01-22 DIAGNOSIS — I65.21 CAROTID ARTERY THROMBOSIS, RIGHT: ICD-10-CM

## 2024-01-22 DIAGNOSIS — I65.21 INTERNAL CAROTID ARTERY STENOSIS, RIGHT: ICD-10-CM

## 2024-01-22 DIAGNOSIS — I63.9 ACUTE ISCHEMIC STROKE (HCC): Primary | ICD-10-CM

## 2024-01-22 PROBLEM — I63.411 CEREBROVASCULAR ACCIDENT (CVA) DUE TO EMBOLISM OF RIGHT MIDDLE CEREBRAL ARTERY (HCC): Status: ACTIVE | Noted: 2024-01-22

## 2024-01-22 LAB
ALBUMIN SERPL-MCNC: 4.2 G/DL (ref 3.4–5)
ALBUMIN/GLOB SERPL: 1.2 {RATIO} (ref 1.1–2.2)
ALP SERPL-CCNC: 76 U/L (ref 40–129)
ALT SERPL-CCNC: 14 U/L (ref 10–40)
ANION GAP SERPL CALCULATED.3IONS-SCNC: 10 MMOL/L (ref 3–16)
ANTI-XA UNFRAC HEPARIN: 0.27 IU/ML (ref 0.3–0.7)
APTT BLD: 56.1 SEC (ref 22.7–35.9)
AST SERPL-CCNC: 27 U/L (ref 15–37)
BASOPHILS # BLD: 0.1 K/UL (ref 0–0.2)
BASOPHILS NFR BLD: 1.4 %
BILIRUB SERPL-MCNC: 0.6 MG/DL (ref 0–1)
BUN SERPL-MCNC: 17 MG/DL (ref 7–20)
CALCIUM SERPL-MCNC: 9.2 MG/DL (ref 8.3–10.6)
CHLORIDE SERPL-SCNC: 105 MMOL/L (ref 99–110)
CO2 SERPL-SCNC: 23 MMOL/L (ref 21–32)
CREAT SERPL-MCNC: 0.9 MG/DL (ref 0.6–1.2)
DEPRECATED RDW RBC AUTO: 16.2 % (ref 12.4–15.4)
DEPRECATED RDW RBC AUTO: 17.6 % (ref 12.4–15.4)
EKG ATRIAL RATE: 70 BPM
EKG DIAGNOSIS: NORMAL
EKG P AXIS: 28 DEGREES
EKG P-R INTERVAL: 154 MS
EKG Q-T INTERVAL: 412 MS
EKG QRS DURATION: 84 MS
EKG QTC CALCULATION (BAZETT): 444 MS
EKG R AXIS: 9 DEGREES
EKG T AXIS: 5 DEGREES
EKG VENTRICULAR RATE: 70 BPM
EOSINOPHIL # BLD: 0.2 K/UL (ref 0–0.6)
EOSINOPHIL NFR BLD: 2.6 %
GFR SERPLBLD CREATININE-BSD FMLA CKD-EPI: >60 ML/MIN/{1.73_M2}
GLUCOSE BLD-MCNC: 132 MG/DL (ref 70–99)
GLUCOSE SERPL-MCNC: 137 MG/DL (ref 70–99)
HCT VFR BLD AUTO: 41.5 % (ref 36–48)
HCT VFR BLD AUTO: 45.5 % (ref 36–48)
HGB BLD-MCNC: 13.9 G/DL (ref 12–16)
HGB BLD-MCNC: 14.2 G/DL (ref 12–16)
INR PPP: 0.96 (ref 0.84–1.16)
INR PPP: 0.97 (ref 0.84–1.16)
LYMPHOCYTES # BLD: 1.9 K/UL (ref 1–5.1)
LYMPHOCYTES NFR BLD: 26.4 %
MCH RBC QN AUTO: 28.6 PG (ref 26–34)
MCH RBC QN AUTO: 28.9 PG (ref 26–34)
MCHC RBC AUTO-ENTMCNC: 31.3 G/DL (ref 31–36)
MCHC RBC AUTO-ENTMCNC: 33.6 G/DL (ref 31–36)
MCV RBC AUTO: 86.1 FL (ref 80–100)
MCV RBC AUTO: 91.5 FL (ref 80–100)
MONOCYTES # BLD: 0.5 K/UL (ref 0–1.3)
MONOCYTES NFR BLD: 7.6 %
NEUTROPHILS # BLD: 4.5 K/UL (ref 1.7–7.7)
NEUTROPHILS NFR BLD: 62 %
PERFORMED ON: ABNORMAL
PLATELET # BLD AUTO: 174 K/UL (ref 135–450)
PLATELET # BLD AUTO: 185 K/UL (ref 135–450)
PMV BLD AUTO: 9.3 FL (ref 5–10.5)
PMV BLD AUTO: 9.4 FL (ref 5–10.5)
POTASSIUM SERPL-SCNC: 5.2 MMOL/L (ref 3.5–5.1)
PROT SERPL-MCNC: 7.6 G/DL (ref 6.4–8.2)
PROTHROMBIN TIME: 12.8 SEC (ref 11.5–14.8)
PROTHROMBIN TIME: 12.9 SEC (ref 11.5–14.8)
RBC # BLD AUTO: 4.81 M/UL (ref 4–5.2)
RBC # BLD AUTO: 4.97 M/UL (ref 4–5.2)
SODIUM SERPL-SCNC: 138 MMOL/L (ref 136–145)
TROPONIN, HIGH SENSITIVITY: 8 NG/L (ref 0–14)
WBC # BLD AUTO: 7.2 K/UL (ref 4–11)
WBC # BLD AUTO: 9.2 K/UL (ref 4–11)

## 2024-01-22 PROCEDURE — 6360000002 HC RX W HCPCS

## 2024-01-22 PROCEDURE — 6370000000 HC RX 637 (ALT 250 FOR IP): Performed by: INTERNAL MEDICINE

## 2024-01-22 PROCEDURE — 93005 ELECTROCARDIOGRAM TRACING: CPT

## 2024-01-22 PROCEDURE — 6370000000 HC RX 637 (ALT 250 FOR IP)

## 2024-01-22 PROCEDURE — 99285 EMERGENCY DEPT VISIT HI MDM: CPT

## 2024-01-22 PROCEDURE — 93010 ELECTROCARDIOGRAM REPORT: CPT | Performed by: INTERNAL MEDICINE

## 2024-01-22 PROCEDURE — 84484 ASSAY OF TROPONIN QUANT: CPT

## 2024-01-22 PROCEDURE — 99223 1ST HOSP IP/OBS HIGH 75: CPT | Performed by: PSYCHIATRY & NEUROLOGY

## 2024-01-22 PROCEDURE — 71045 X-RAY EXAM CHEST 1 VIEW: CPT

## 2024-01-22 PROCEDURE — 1200000000 HC SEMI PRIVATE

## 2024-01-22 PROCEDURE — 36415 COLL VENOUS BLD VENIPUNCTURE: CPT

## 2024-01-22 PROCEDURE — 2000000000 HC ICU R&B

## 2024-01-22 PROCEDURE — 6360000004 HC RX CONTRAST MEDICATION

## 2024-01-22 PROCEDURE — 70498 CT ANGIOGRAPHY NECK: CPT

## 2024-01-22 PROCEDURE — 85730 THROMBOPLASTIN TIME PARTIAL: CPT

## 2024-01-22 PROCEDURE — 85027 COMPLETE CBC AUTOMATED: CPT

## 2024-01-22 PROCEDURE — 99222 1ST HOSP IP/OBS MODERATE 55: CPT | Performed by: NURSE PRACTITIONER

## 2024-01-22 PROCEDURE — 85025 COMPLETE CBC W/AUTO DIFF WBC: CPT

## 2024-01-22 PROCEDURE — 70450 CT HEAD/BRAIN W/O DYE: CPT

## 2024-01-22 PROCEDURE — 85520 HEPARIN ASSAY: CPT

## 2024-01-22 PROCEDURE — 96365 THER/PROPH/DIAG IV INF INIT: CPT

## 2024-01-22 PROCEDURE — 85610 PROTHROMBIN TIME: CPT

## 2024-01-22 PROCEDURE — 80053 COMPREHEN METABOLIC PANEL: CPT

## 2024-01-22 RX ORDER — SODIUM CHLORIDE 0.9 % (FLUSH) 0.9 %
5-40 SYRINGE (ML) INJECTION EVERY 12 HOURS SCHEDULED
Status: DISCONTINUED | OUTPATIENT
Start: 2024-01-22 | End: 2024-02-01 | Stop reason: HOSPADM

## 2024-01-22 RX ORDER — ONDANSETRON 4 MG/1
4 TABLET, ORALLY DISINTEGRATING ORAL EVERY 8 HOURS PRN
Status: DISCONTINUED | OUTPATIENT
Start: 2024-01-22 | End: 2024-02-01 | Stop reason: HOSPADM

## 2024-01-22 RX ORDER — HEPARIN SODIUM 1000 [USP'U]/ML
60 INJECTION, SOLUTION INTRAVENOUS; SUBCUTANEOUS PRN
Status: DISCONTINUED | OUTPATIENT
Start: 2024-01-22 | End: 2024-01-22

## 2024-01-22 RX ORDER — ASPIRIN 81 MG/1
324 TABLET, CHEWABLE ORAL ONCE
Status: COMPLETED | OUTPATIENT
Start: 2024-01-22 | End: 2024-01-22

## 2024-01-22 RX ORDER — ATORVASTATIN CALCIUM 80 MG/1
80 TABLET, FILM COATED ORAL NIGHTLY
Status: DISCONTINUED | OUTPATIENT
Start: 2024-01-22 | End: 2024-02-01 | Stop reason: HOSPADM

## 2024-01-22 RX ORDER — POLYETHYLENE GLYCOL 3350 17 G/17G
17 POWDER, FOR SOLUTION ORAL DAILY PRN
Status: DISCONTINUED | OUTPATIENT
Start: 2024-01-22 | End: 2024-02-01 | Stop reason: HOSPADM

## 2024-01-22 RX ORDER — SODIUM CHLORIDE 0.9 % (FLUSH) 0.9 %
5-40 SYRINGE (ML) INJECTION PRN
Status: DISCONTINUED | OUTPATIENT
Start: 2024-01-22 | End: 2024-02-01 | Stop reason: HOSPADM

## 2024-01-22 RX ORDER — ASPIRIN 300 MG/1
300 SUPPOSITORY RECTAL DAILY
Status: CANCELLED | OUTPATIENT
Start: 2024-01-22

## 2024-01-22 RX ORDER — ONDANSETRON 2 MG/ML
4 INJECTION INTRAMUSCULAR; INTRAVENOUS EVERY 6 HOURS PRN
Status: DISCONTINUED | OUTPATIENT
Start: 2024-01-22 | End: 2024-02-01 | Stop reason: HOSPADM

## 2024-01-22 RX ORDER — SODIUM CHLORIDE 9 MG/ML
INJECTION, SOLUTION INTRAVENOUS PRN
Status: DISCONTINUED | OUTPATIENT
Start: 2024-01-22 | End: 2024-02-01 | Stop reason: HOSPADM

## 2024-01-22 RX ORDER — HEPARIN SODIUM 1000 [USP'U]/ML
30 INJECTION, SOLUTION INTRAVENOUS; SUBCUTANEOUS PRN
Status: DISCONTINUED | OUTPATIENT
Start: 2024-01-22 | End: 2024-01-22

## 2024-01-22 RX ORDER — HEPARIN SODIUM 1000 [USP'U]/ML
60 INJECTION, SOLUTION INTRAVENOUS; SUBCUTANEOUS ONCE
Status: DISCONTINUED | OUTPATIENT
Start: 2024-01-22 | End: 2024-01-22

## 2024-01-22 RX ORDER — ASPIRIN 81 MG/1
81 TABLET, CHEWABLE ORAL DAILY
Status: CANCELLED | OUTPATIENT
Start: 2024-01-22

## 2024-01-22 RX ORDER — HEPARIN SODIUM 10000 [USP'U]/100ML
0-4000 INJECTION, SOLUTION INTRAVENOUS CONTINUOUS
Status: DISCONTINUED | OUTPATIENT
Start: 2024-01-22 | End: 2024-01-22 | Stop reason: HOSPADM

## 2024-01-22 RX ORDER — HEPARIN SODIUM 10000 [USP'U]/100ML
5-30 INJECTION, SOLUTION INTRAVENOUS CONTINUOUS
Status: DISCONTINUED | OUTPATIENT
Start: 2024-01-22 | End: 2024-01-27

## 2024-01-22 RX ORDER — LABETALOL HYDROCHLORIDE 5 MG/ML
10 INJECTION, SOLUTION INTRAVENOUS EVERY 10 MIN PRN
Status: DISCONTINUED | OUTPATIENT
Start: 2024-01-22 | End: 2024-01-23

## 2024-01-22 RX ADMIN — IOPAMIDOL 75 ML: 755 INJECTION, SOLUTION INTRAVENOUS at 09:52

## 2024-01-22 RX ADMIN — ATORVASTATIN CALCIUM 80 MG: 80 TABLET, FILM COATED ORAL at 21:25

## 2024-01-22 RX ADMIN — HEPARIN SODIUM 1000 UNITS/HR: 10000 INJECTION, SOLUTION INTRAVENOUS at 11:44

## 2024-01-22 RX ADMIN — HEPARIN SODIUM 12 UNITS/KG/HR: 10000 INJECTION, SOLUTION INTRAVENOUS at 15:32

## 2024-01-22 RX ADMIN — ASPIRIN 81 MG 324 MG: 81 TABLET ORAL at 10:56

## 2024-01-22 ASSESSMENT — PAIN - FUNCTIONAL ASSESSMENT: PAIN_FUNCTIONAL_ASSESSMENT: NONE - DENIES PAIN

## 2024-01-22 ASSESSMENT — LIFESTYLE VARIABLES
HOW OFTEN DO YOU HAVE A DRINK CONTAINING ALCOHOL: NEVER
HOW MANY STANDARD DRINKS CONTAINING ALCOHOL DO YOU HAVE ON A TYPICAL DAY: PATIENT DOES NOT DRINK

## 2024-01-22 NOTE — ED NOTES
Report received from Marcia KOCH to assume care. EDSBAR discussed. Transferring to [facility] for neurology.

## 2024-01-22 NOTE — ED PROVIDER NOTES
[facility] EMERGENCY DEPARTMENT  EMERGENCY DEPARTMENT ENCOUNTER        Pt Name: Rosalba Wilson  MRN: [id number]  Birthdate 1955  Date of evaluation: 1/22/2024  Provider: DEYANIRA Carrillo CNP  PCP: Georgia Martinez APRN - CNP  Note Started: 9:51 AM EST 1/22/24       I have seen and evaluated this patient with my supervising physician Dr Camargo      CHIEF COMPLAINT       Chief Complaint   Patient presents with    Extremity Weakness     Left arm weakness onset 2 days ago.  Pt denies any other symptoms, denies any known injury to left arm.  No Hx of CVA.         HISTORY OF PRESENT ILLNESS: 1 or more Elements     History From: Patient, daughter at the bedside    Limitations to history : [facility]    Social Determinants Significantly Affecting Health : Patient has significant healthcare illiteracy    Chief Complaint: Above    Rosalba Wilson is a 68 y.o. female who presents to ED with concern for left arm weakness.  Symptoms started 2 days ago with decreased sensation to the left upper extremity.  According to the daughter patient was able to do her hair using that arm up till yesterday night.  Went to bed about 10:00 last night with mobility to the arm.  About 730 this morning patient has inability to move left arm.  Still has decree sensation.  No headaches.  No other symptoms.  History of hypertension, hyperlipidemia.    The patient  reports that she has never smoked. She does not have any smokeless tobacco history on file. She reports that she does not drink alcohol and does not use drugs.       Nursing Notes were all reviewed and agreed with or any disagreements were addressed in the HPI.    REVIEW OF SYSTEMS :      Review of Systems    Positives and Pertinent negatives as per HPI.     SURGICAL HISTORY   History reviewed. No pertinent surgical history.    CURRENTMEDICATIONS       There are no discharge medications for this patient.      ALLERGIES     Patient has no known

## 2024-01-22 NOTE — PROGRESS NOTES
Patient arrived to room 4524 from [facility]. Pt. Able to ambulate to bed. VSS and on room air. Chlorhexidine bath completed and sacral heart denied by patient. Pt. AxOx4 with no deficits other than some numbness in the left arm. No further needs at this and call light within reach. Neurosurgery and neurocritical care notified of patient's arrival.

## 2024-01-22 NOTE — CONSULTS
NEUROSURGERY CONSULT  LEYDI COLLADO  [id number]   1955 1/22/2024    Requesting physician: Chetan Eaton MD    Reason for consultation: ICA stenosis     History of present illness: Patient is a 68 y.o. female w/ PMH of CKD, HTN, varicose veins, who presented on 1/22/2024 with left arm weakness/sensory changes.  used to obtain history from patient. She said about 2 days ago she started noticing some intermittent sensory changes in her left arm, and that it felt heavier then normal but she had full range of motion/strength. This morning she developed left arm weakness, she noticed when she was unable to brush her hair with that hand/arm. She presented to [facility] ED for evaluation and stroke team was notified. Initial /78, NIHSS 3 (reported sensory deficit and L arm drift). CT head with some right right parietal hypodensity concerning for infarct, CTA head and neck with high-grade stenosis of proximal R ICA ~90% with associated sub-occlusive thrombus. She was not a candidate for TNK given she was outside of treatment window. She was started on a neuro protocol heparin drip and transferred to [facility] for further evaluation.      ROS:   GENERAL:  Denies fever or recent illness. Denies weight changes   NEURO: +sensory deficit    :  Denies urinary difficulty  GI: Denies nausea, vomiting, diarrhea or constipation  MUSCULOSKELETAL:  No arthralgias    No Known Allergies    Past Medical History:   Diagnosis Date    IFG (impaired fasting glucose)     Shoulder pain, right     Varicose vein of leg 7/23/2015        No past surgical history on file.    Social History     Occupational History    Not on file   Tobacco Use    Smoking status: Never    Smokeless tobacco: Not on file   Substance and Sexual Activity    Alcohol use: No    Drug use: No    Sexual activity: Not on file        No family history on file.     No outpatient medications have been marked as taking for the 1/22/24 encounter (Hospital

## 2024-01-22 NOTE — ED NOTES
Report called to Polly KOCH at [facility]. EDSBAR discussed. Pt NIHSS zero at this time. Heparin infusing at 1000u/hr. Pt A&Ox4, denies any needs at  this time. [facility] ETA to [place] ED 12:45pm. Pt to go to room 4524.

## 2024-01-22 NOTE — CONSULTS
Clinical Pharmacy Note  Heparin Dosing Consult    Rosalba Wilson is a 68 y.o. female ordered heparin per CAD/STEMI/NSTEMI/UA/AFIB nomogram by Cecil Manzano.     No results found for: \"ANTIXAUHEP\", \"LABHEPA\"   Lab Results   Component Value Date/Time    HGB 13.9 01/22/2024 09:51 AM    HCT 41.5 01/22/2024 09:51 AM     01/22/2024 09:51 AM    INR 0.96 01/22/2024 09:51 AM       Ht Readings from Last 1 Encounters:   01/22/24 1.651 m (5' 5\")        Wt Readings from Last 1 Encounters:   01/22/24 87.1 kg (192 lb 0.3 oz)        Assessment/Plan:  Initial bolus: none   Initial infusion rate: 1000 units/hr  Next anti-Xa:: 1/22/24 1800    Pharmacy will continue to monitor adjust heparin based on anti-Xa results using nomogram below:     CAD/STEMI/NSTEMI/UA/AFIB Heparin Nomogram     Initial Bolus: 60 units/kg Max Bolus: 4,000 units       Initial Rate: 12 units/kg/hr Max Initial Rate: 1,000 units/hr     anti-Xa Bolus Titration   < 0.1 Heparin 60 units/kg bolus Increase drip by 4 units/kg/hr   0.1 - 0.29 Heparin 30 units/kg bolus Increase drip by 2 units/kg/hr   0.3 - 0.7 No Bolus No Change   0.71 - 0.8 No Bolus Decrease drip by 1 units/kg/hr   0.81 - 0.99 No Bolus Decrease drip by 2 units/kg/hr   > 1 Hold Heparin for 1 hour Decrease drip by 3 units/kg/hr     Obtain anti-Xa 6 hours after initial bolus and 6 hours after any dose change until two consecutive therapeutic anti-Xa levels are achieved - then daily.  Izzy Duran, [facility],1/22/2024,1:34 PM

## 2024-01-22 NOTE — CARE COORDINATION
Case Management Assessment  Initial Evaluation    Date/Time of Evaluation: 1/22/2024 3:14 PM  Assessment Completed by: Manju Guerrero RN    If patient is discharged prior to next notation, then this note serves as note for discharge by case management.    Patient Name: Rosalba Wilson                   YOB: 1955  Diagnosis: Acute CVA (cerebrovascular accident) (HCC) [I63.9]                   Date / Time: 1/22/2024  2:00 PM    Patient Admission Status: Inpatient   Readmission Risk (Low < 19, Mod (19-27), High > 27): No data recorded  Current PCP: Georgia Martinez APRN - CNP  PCP verified by CM? Yes    Chart Reviewed: Yes      History Provided by: Child/Family, Medical Record  Patient Orientation: Alert and Oriented (Faroese-speaking)    Patient Cognition: Alert    Hospitalization in the last 30 days (Readmission):  No    If yes, Readmission Assessment in  Navigator will be completed.    Advance Directives:      Code Status: Prior   Patient's Primary Decision Maker is: Legal Next of Kin    Primary Decision Maker: Sandra Hensley  Child - [phone]    Secondary Decision Maker: HiginioArjun walton  Child - [phone]    Discharge Planning:    Patient lives with: Family Members (daughter, son-in-law, grandchild) Type of Home: House  Primary Care Giver: Self  Patient Support Systems include: Family Members   Current Financial resources: Medicare, Medicaid  Current community resources: ECF/Home Care (Active with [facility])  Current services prior to admission: Home Care ([facility])            Type of Home Care services:  PT, OT, Nursing Services    ADLS  Prior functional level: Independent in ADLs/IADLs  Current functional level: Other (see comment) (TBD pending PTOT evaluations)    Family can provide assistance at DC: Yes  Would you like Case Management to discuss the discharge plan with any other family members/significant others, and if so, who? Yes (daughter or son)  Plans to Return

## 2024-01-22 NOTE — CONSULTS
Stroke Team Consult Note    Received stroke consult on 01/22/24 at 0948.    Rosalba Wilson is a 68 y.o. female with HTN, CKD 3a presenting to [facility] on 01/22/24 for wake-up symptom of LEFT arm weakness, and 2-day history of LEFT sensory deficit.    Last known well (LKW) 2 days ago when she developed LEFT sensory symptoms. Went to bed last night 01/21/2024 at 2200 and woke up with new LEFT arm weakness. Initial /78, , NIHSS 3 for LEFT sensory deficit, LEFT arm drifts to bed (per report from Dr. Manzano). CT head with some possible RIGHT frontal/parietal hypodensity c/f acute/subacute stroke. CTA head and neck with heterogenous plaque producing high-grade stenosis of proximal R ICA ~90% with associated sub-occlusive thrombus. Not a candidate for thrombolytic due to presentation outside the treatment window (>4.5 hours from symptom onset). Not a candidate for thrombectomy since there is no large-vessel occlusion.             -start low-intensity Heparin gtt target hPTT 60-80, no bolus  -Vascular Surgery/NSGY consult for symptomatic high-grade stenosis of R ICA   -permissive hypertension BP<220/120 for first 24 hours, then reduce by 20% daily with normotensive goal  -MRI brain w/o contrast  -TTE w/bubble evaluate for intracardiac thrombus, PFO  -LDL goal <70 for secondary prevention of stroke; start statin  -A1C goal <6.5% for secondary prevention of stroke  -PT/OT/SLP  -local Neurology consult    Cherelle Street MD PGY5 Vascular Neurology Fellow

## 2024-01-22 NOTE — ED NOTES
Pt in the care of [facility] and is en route to [facility] via ambulance. Heparin infusing at 1000u/hr. [facility] borrowing [facility]'s IV pump and 1 module. Pt A&Ox4, VS stable. NIHSS score of zero at this time.

## 2024-01-22 NOTE — H&P
Recent Labs     01/22/24  0951   WBC 7.2   HGB 13.9        BMP:    Recent Labs     01/22/24  0951      K 5.2*      CO2 23   BUN 17   CREATININE 0.9   GLUCOSE 137*     Hepatic:   Recent Labs     01/22/24  0951   AST 27   ALT 14   BILITOT 0.6   ALKPHOS 76     Lipids: No results found for: \"CHOL\", \"HDL\", \"TRIG\"  Hemoglobin A1C:   Lab Results   Component Value Date/Time    LABA1C 5.7 07/01/2015 04:49 PM     TSH:   Lab Results   Component Value Date/Time    TSH 1.76 07/01/2015 04:41 PM     Troponin: No results found for: \"TROPONINT\"  Lactic Acid: No results for input(s): \"LACTA\" in the last 72 hours.  BNP: No results for input(s): \"PROBNP\" in the last 72 hours.  UA:  Lab Results   Component Value Date/Time    NITRU POSITIVE 04/04/2023 05:05 AM    COLORU Yellow 04/04/2023 05:05 AM    PHUR 5.5 04/04/2023 05:05 AM    WBCUA 18 04/04/2023 05:05 AM    RBCUA 1 04/04/2023 05:05 AM    MUCUS 1+ 01/05/2011 01:55 PM    BACTERIA 4+ 04/04/2023 05:05 AM    CLARITYU Clear 04/04/2023 05:05 AM    SPECGRAV 1.023 04/04/2023 05:05 AM    LEUKOCYTESUR SMALL 04/04/2023 05:05 AM    UROBILINOGEN 0.2 04/04/2023 05:05 AM    BILIRUBINUR Negative 04/04/2023 05:05 AM    BILIRUBINUR NEGATIVE 01/05/2011 01:55 PM    BLOODU Negative 04/04/2023 05:05 AM    GLUCOSEU Negative 04/04/2023 05:05 AM    GLUCOSEU NEGATIVE 01/05/2011 01:55 PM    KETUA Negative 04/04/2023 05:05 AM     Urine Cultures:   Lab Results   Component Value Date/Time    LABURIN >100,000 CFU/ml 04/04/2023 05:05 AM     Blood Cultures:   Lab Results   Component Value Date/Time    BC No Growth after 4 days of incubation. 04/01/2023 11:17 PM     No results found for: \"BLOODCULT2\"  Organism:   Lab Results   Component Value Date/Time    ORG Escherichia coli 04/04/2023 05:05 AM       Imaging/Diagnostics Last 24 Hours   CTA HEAD NECK W CONTRAST    Result Date: 1/22/2024  EXAMINATION: CTA OF THE HEAD AND NECK WITH CONTRAST 1/22/2024 9:52 am: TECHNIQUE: CTA of the head and neck was  Reason for Exam: Stroke Symptoms- left arm weakness Relevant Medical/Surgical History: none FINDINGS: BRAIN/VENTRICLES: Motion artifact degrades image quality.  There is no acute intracerebral hemorrhage or extra-axial fluid collection.  There is mild cerebral atrophy with mild periventricular white matter small vessel ischemic disease. However, there is loss of gray-white matter differentiation within the right parietal lobe concerning for an acute infarct. ORBITS: The orbits are unremarkable. SINUSES: There is partial opacification of the left mastoid air cells. SOFT TISSUES/SKULL:  The calvarium is intact.     1. Loss of gray-white matter differentiation within the right parietal lobe concerning for an acute infarct. Findings were discussed with TERRY PERKINS at 10:24 am on 1/22/2024.         Electronically signed by Jennifer Ro MD on 1/22/2024 at 3:21 PM

## 2024-01-22 NOTE — PLAN OF CARE
Problem: Discharge Planning  Goal: Discharge to home or other facility with appropriate resources  Outcome: Progressing     Problem: Chronic Conditions and Co-morbidities  Goal: Patient's chronic conditions and co-morbidity symptoms are monitored and maintained or improved  Outcome: Progressing     Problem: Neurosensory - Adult  Goal: Achieves stable or improved neurological status  Outcome: Progressing  Goal: Achieves maximal functionality and self care  Outcome: Progressing     Problem: ABCDS Injury Assessment  Goal: Absence of physical injury  Outcome: Progressing

## 2024-01-22 NOTE — PROGRESS NOTES
4 Eyes Admission Assessment     I agree as the admission nurse that 2 RN's have performed a thorough Head to Toe Skin Assessment on the patient. ALL assessment sites listed below have been assessed on admission.       Areas assessed by both nurses: yes  [x]   Head, Face, and Ears   [x]   Shoulders, Back, and Chest  [x]   Arms, Elbows, and Hands   [x]   Coccyx, Sacrum, and Ischium  [x]   Legs, Feet, and Heels        Does the Patient have Skin Breakdown?  No         Jose Prevention initiated:  No   Wound Care Orders initiated:  No      [facility] nurse consulted for Pressure Injury (Stage 3,4, Unstageable, DTI, NWPT, and Complex wounds) or Jose score 18 or lower:  No      Nurse 1 eSignature: Electronically signed by Alba Dumont RN on 1/22/24 at 4:11 PM EST    Nurse 2 eSignature: Electronically signed by Madonna Johnson RN on 1/22/24 at 6:11 PM EST

## 2024-01-22 NOTE — ED TRIAGE NOTES
Pt into ER from home with Left arm weakness onset 2 days ago.  Pt denies any other symptoms, denies any known injury to left arm.  No Hx of CVA.

## 2024-01-22 NOTE — ED NOTES
Pharmacy reports ok to start heparin infusion immediately at 1000 units/hr; necessary blood work complete.

## 2024-01-22 NOTE — CONSULTS
Chart reviewed, events noted, and I have personally performed a face-to-face diagnostic evaluation on this patient, including physical examination. I have personally reviewed CNP's note and confirmed the documented past medical history, family history, social history, allergies, home medications, review of systems, vital signs, laboratory values, and hospital medications via my own chart review, in person with the patient, and/or in person with her family members as appropriate. My findings are as follows:    69yo with HTN; HLD; CKD. Presented to OSH with left arm numbness. Per pt's daughter, pt was to her baseline last night when she went to bed and woke this morning with weakness of her left arm. Pt's daughter states that she was talking to her mother on the phone this morning and she described these symptoms but her daughter also thought her speech momentarily sounded off. She was answering all questions appropriately.     In ER, her NIHSS score was a 3 for both left arm weakness and sensory deficit. Head CT showed a developing right parietal lobe infarct. CTA head and neck showed a 90% stenosis of the right cervical ICA with an intraluminal thrombus.     Neurovascular surgery was contacted who recommended heparin gtt and transfer here for further monitoring and intervention, if indicated.    On exam, no cranial nerve abnormalities with symmetric smile. No dysarthria. Left arm slightly weak compared to right (4+/5) including shoulder, elbow, and . Both legs strong. FNF intact.    CT and CTA images personally reviewed, which demonstrate developing hypodensity and loss of gray-white matter in right parietal lobe suggestive of acute infarct. 90% stenosis of ELISE cervical segment with intraluminal thrombus.    Assessment  69yo woman with abrupt onset right arm weakness and found to have developing stroke in left frontoparietal region and 90% stenosis in ELISE cervical segment with intraluminal thrombus,  family history on file.  Social History     Tobacco Use    Smoking status: Never   Substance Use Topics    Alcohol use: No    Drug use: No       Allergies & Outpatient Medications   ALLERGIES:  No Known Allergies  HOME MEDICATIONS:  No current outpatient medications    Physical Exam   PHYSICAL EXAM:  Vitals:    01/22/24 1400   BP: 104/76   Temp: 97.5 °F (36.4 °C)   TempSrc: Temporal   SpO2: 92%   Weight: 86.5 kg (190 lb 11.2 oz)   Height: 1.651 m (5' 5\")         General: Alert, no distress, well-nourished  Neurologic  Mental status:   Oriented x4, clear speech, no aphasia    Cranial nerves:   CN2: Visual fields full w/o extinction on confrontational testing   CN 3,4,6: Pupils equal and reactive to light, extraocular muscles intact  CN5: Facial sensation symmetric   CN7: Face symmetric  CN8: Hearing symmetric to spoken voice  CN9: Palate elevated symmetrically  CN11: Traps full strength on shoulder shrug  CN12: Tongue midline with protrusion    Motor Exam:  5/5 strength throughout    Deep tendon reflexes:    R  L    Biceps  2  2   Brachioradialis  2 2   Patellar  2 2     Sensory: light touch intact and symmetric in all 4 extremities.  No sensory extinction on bilateral simultaneous stimulation  Cerebellar/coordination: finger nose finger normal without ataxia  Tone: normal in all 4 extremities  Gait: intact with standby assist    NIHSS: 0    Diagnostic Testing Results   IMAGES:  Images personally reviewed and agree w/ radiology interpretation of:    Head CT w/o Contrast:  IMPRESSION:  1. Loss of gray-white matter differentiation within the right parietal lobe  concerning for an acute infarct.    CTA of Head / Neck w/ Contrast:  IMPRESSION:  1. Severe atherosclerotic disease of the proximal right cervical internal  carotid artery resulting in 90% diameter stenosis.  Small pedunculated  intraluminal thrombus is present at this location.  2. Small acute infarct within the right frontal parietal lobe.  3. No intracranial

## 2024-01-23 ENCOUNTER — APPOINTMENT (OUTPATIENT)
Dept: CT IMAGING | Age: 69
End: 2024-01-23
Attending: SURGERY
Payer: MEDICARE

## 2024-01-23 ENCOUNTER — APPOINTMENT (OUTPATIENT)
Dept: MRI IMAGING | Age: 69
End: 2024-01-23
Attending: SURGERY
Payer: MEDICARE

## 2024-01-23 LAB
ANTI-XA UNFRAC HEPARIN: 0.28 IU/ML (ref 0.3–0.7)
ANTI-XA UNFRAC HEPARIN: 0.48 IU/ML (ref 0.3–0.7)
ANTI-XA UNFRAC HEPARIN: 0.49 IU/ML (ref 0.3–0.7)
ANTI-XA UNFRAC HEPARIN: 0.6 IU/ML (ref 0.3–0.7)
DEPRECATED RDW RBC AUTO: 16.1 % (ref 12.4–15.4)
EST. AVERAGE GLUCOSE BLD GHB EST-MCNC: 122.6 MG/DL
HBA1C MFR BLD: 5.9 %
HCT VFR BLD AUTO: 39.1 % (ref 36–48)
HGB BLD-MCNC: 12.9 G/DL (ref 12–16)
MCH RBC QN AUTO: 28.8 PG (ref 26–34)
MCHC RBC AUTO-ENTMCNC: 32.9 G/DL (ref 31–36)
MCV RBC AUTO: 87.5 FL (ref 80–100)
PLATELET # BLD AUTO: 163 K/UL (ref 135–450)
PMV BLD AUTO: 9 FL (ref 5–10.5)
RBC # BLD AUTO: 4.47 M/UL (ref 4–5.2)
WBC # BLD AUTO: 8.1 K/UL (ref 4–11)

## 2024-01-23 PROCEDURE — 36415 COLL VENOUS BLD VENIPUNCTURE: CPT

## 2024-01-23 PROCEDURE — 2580000003 HC RX 258: Performed by: INTERNAL MEDICINE

## 2024-01-23 PROCEDURE — 80061 LIPID PANEL: CPT

## 2024-01-23 PROCEDURE — 2580000003 HC RX 258

## 2024-01-23 PROCEDURE — 92610 EVALUATE SWALLOWING FUNCTION: CPT

## 2024-01-23 PROCEDURE — 6370000000 HC RX 637 (ALT 250 FOR IP): Performed by: INTERNAL MEDICINE

## 2024-01-23 PROCEDURE — 83036 HEMOGLOBIN GLYCOSYLATED A1C: CPT

## 2024-01-23 PROCEDURE — 85027 COMPLETE CBC AUTOMATED: CPT

## 2024-01-23 PROCEDURE — 70450 CT HEAD/BRAIN W/O DYE: CPT

## 2024-01-23 PROCEDURE — 97530 THERAPEUTIC ACTIVITIES: CPT

## 2024-01-23 PROCEDURE — 99291 CRITICAL CARE FIRST HOUR: CPT

## 2024-01-23 PROCEDURE — 99231 SBSQ HOSP IP/OBS SF/LOW 25: CPT | Performed by: NURSE PRACTITIONER

## 2024-01-23 PROCEDURE — 97161 PT EVAL LOW COMPLEX 20 MIN: CPT

## 2024-01-23 PROCEDURE — 70551 MRI BRAIN STEM W/O DYE: CPT

## 2024-01-23 PROCEDURE — 97535 SELF CARE MNGMENT TRAINING: CPT

## 2024-01-23 PROCEDURE — 97116 GAIT TRAINING THERAPY: CPT

## 2024-01-23 PROCEDURE — 6360000004 HC RX CONTRAST MEDICATION: Performed by: INTERNAL MEDICINE

## 2024-01-23 PROCEDURE — 97165 OT EVAL LOW COMPLEX 30 MIN: CPT

## 2024-01-23 PROCEDURE — C8929 TTE W OR WO FOL WCON,DOPPLER: HCPCS

## 2024-01-23 PROCEDURE — 6360000002 HC RX W HCPCS

## 2024-01-23 PROCEDURE — 2000000000 HC ICU R&B

## 2024-01-23 PROCEDURE — 85520 HEPARIN ASSAY: CPT

## 2024-01-23 RX ORDER — LABETALOL HYDROCHLORIDE 5 MG/ML
10 INJECTION, SOLUTION INTRAVENOUS EVERY 4 HOURS PRN
Status: DISCONTINUED | OUTPATIENT
Start: 2024-01-23 | End: 2024-02-01 | Stop reason: HOSPADM

## 2024-01-23 RX ORDER — SODIUM CHLORIDE, SODIUM GLUCONATE, SODIUM ACETATE, POTASSIUM CHLORIDE AND MAGNESIUM CHLORIDE 526; 502; 368; 37; 30 MG/100ML; MG/100ML; MG/100ML; MG/100ML; MG/100ML
75 INJECTION, SOLUTION INTRAVENOUS CONTINUOUS
Status: DISCONTINUED | OUTPATIENT
Start: 2024-01-23 | End: 2024-01-26

## 2024-01-23 RX ADMIN — SODIUM CHLORIDE, PRESERVATIVE FREE 10 ML: 5 INJECTION INTRAVENOUS at 11:11

## 2024-01-23 RX ADMIN — HEPARIN SODIUM 14 UNITS/KG/HR: 10000 INJECTION, SOLUTION INTRAVENOUS at 11:11

## 2024-01-23 RX ADMIN — SODIUM CHLORIDE, PRESERVATIVE FREE 10 ML: 5 INJECTION INTRAVENOUS at 21:05

## 2024-01-23 RX ADMIN — ATORVASTATIN CALCIUM 80 MG: 80 TABLET, FILM COATED ORAL at 21:04

## 2024-01-23 RX ADMIN — PERFLUTREN 1.5 ML: 6.52 INJECTION, SUSPENSION INTRAVENOUS at 08:00

## 2024-01-23 RX ADMIN — SODIUM CHLORIDE, SODIUM GLUCONATE, SODIUM ACETATE, POTASSIUM CHLORIDE AND MAGNESIUM CHLORIDE 75 ML/HR: 526; 502; 368; 37; 30 INJECTION, SOLUTION INTRAVENOUS at 13:56

## 2024-01-23 NOTE — NURSE NAVIGATOR
R parietal acute stroke; CTA head/neck shows R cervical ICA stenosis with intraluminal thrombus.     Patients personal risk factors specific to stroke/TIA include: HTN, HLD, Overweight (BMI 31.66).      Patient's chart reviewed for Stroke Core Measures and additional needs:    [x]   VTE prophylaxis - Heparin gtt, see eMAR    [x]   Antithrombotic (if applicable) - Hold ASA per NCC; Heparin gtt, see eMAR    [x]   Swallow screen prior to PO intake - Completed; SLP consulted, see today's progress note   [x]   Lipids / A1C ordered or resulted - AIC 5.9 (1/23/24); Lipid panel ordered    [x]   Therapy ordered   [x]   Care plan and Education template - In progress     - MRI completed, see results   - ECHO with estimated ejection fraction of 55-60%; bubble study was attempted but cannot r/o a shunt due to poor IV   - Plan for repeat CTA 1/26/24, see orders   - Per NCC, repeat HCT once therapeutic on neuro heparin gtt x2      Latest Reference Range & Units Most Recent   Anti-XA Unfrac Heparin 0.30 - 0.70 IU/mL 0.60  1/23/24 07:28     Navigator to continue to follow patient while admitted, to assist with follow up and discharge planning as needed.     Nurse eSignature: Electronically signed by Tatiana Pina RN on 1/23/24 at 11:36 AM EST - Neuroscience Navigator

## 2024-01-23 NOTE — PROGRESS NOTES
V2.0    [facility] Progress Note      Name:  Rosalba Wilson /Age/Sex: 1955  (68 y.o. female)   MRN & CSN:  [id number] & [id number] Encounter Date/Time: 2024 8:04 AM EST   Location:  [address] [place] [address] PCP: Georgia Martinez APRN - CNP     Attending:Jennifer Ro MD       Hospital Day: 2    Assessment and Recommendations   Rosalba Wilson is a 68 y.o. female with a past medical history of hypertension, hyperlipidemia who presents with Acute CVA (cerebrovascular accident) (HCC)       Plan:     CVA with high-grade right ICA stenosis with subocclusive thrombus-repeat CT after therapeutic on heparin  -Neuro Critical care consulted, consult appreciated  -Neurosurgery consulted.  Consult appreciated  -ct heparin drip  -Permissive hypertension  -TTE shows normal EF.  Bubble study could not be done  -MRI brain shows patchy infarction of the right parietal lobe and watershed territories of the right frontal lobe and occipital temporal lobe  -A1c and lipid panel ordered  -PT OT speech-language pathology consulted  -Neurochecks     Hyperlipidemia  -Continue statin     Hypertension-pressure on the lower side  -Hold blood pressure medication.  Patient takes lisinopril at home     Hyperkalemia  -Mild monitor.  BMP tomorrow a.m.      Diet ADULT DIET; Regular   DVT Prophylaxis [] Lovenox, [x]  Heparin, [] SCDs, [] Ambulation,  [] Eliquis, [] Xarelto  [] Coumadin   Code Status Full Code   Disposition From: Home  Expected Disposition: Home   Estimated Date of Discharge: 2 days  Patient requires continued admission due to heparin drip   Surrogate Decision Maker/ POA       Personally reviewed Lab Studies and Imaging     Discussed management of the case with neurosurgery, neurocritical care who recommended heparin drip    MRI reviewed by me shows parietal stroke along with occipital stroke    Drugs that require monitoring for toxicity include heparin nd the method of monitoring was factor Xa        Subjective:     Chief  the radiation dose to as low as reasonably achievable. COMPARISON: None HISTORY: ORDERING SYSTEM PROVIDED HISTORY: Stroke Symptoms TECHNOLOGIST PROVIDED HISTORY: Has a \"code stroke\" or \"stroke alert\" been called?->Yes Reason for exam:->Stroke Symptoms Decision Support Exception - unselect if not a suspected or confirmed emergency medical condition->Emergency Medical Condition (MA) Reason for Exam: Stroke Symptoms Relevant Medical/Surgical History: none FINDINGS: CTA NECK: AORTIC ARCH/ARCH VESSELS: No dissection or arterial injury.  No significant stenosis of the brachiocephalic or subclavian arteries. CAROTID ARTERIES: Severe atherosclerotic disease identified within the proximal right cervical internal carotid artery resulting in 80-90% diameter stenosis.  Small amount of intraluminal thrombus is present at this location. VERTEBRAL ARTERIES: No dissection, arterial injury, or significant stenosis. SOFT TISSUES: The lung apices are clear.  No cervical or superior mediastinal lymphadenopathy.  The larynx and pharynx are unremarkable.  No acute abnormality of the salivary and thyroid glands. BONES: No acute osseous abnormality. CTA HEAD: ANTERIOR CIRCULATION: No significant stenosis of the intracranial internal carotid, anterior cerebral, or middle cerebral arteries. No aneurysm. POSTERIOR CIRCULATION: No significant stenosis of the vertebral, basilar, or posterior cerebral arteries. No aneurysm. OTHER: No dural venous sinus thrombosis on this non-dedicated study. BRAIN: There is a probable small acute infarct within the right frontal parietal lobe.  No acute intracranial hemorrhage is detected.     1. Severe atherosclerotic disease of the proximal right cervical internal carotid artery resulting in 90% diameter stenosis.  Small pedunculated intraluminal thrombus is present at this location. 2. Small acute infarct within the right frontal parietal lobe. 3. No intracranial large vessel occlusion or aneurysm.     XR

## 2024-01-23 NOTE — PROGRESS NOTES
Physical Therapy  Facility/Department: [facility] ICU  Physical Therapy Initial Assessment/Discharge     Name: Rosalba Wilson  : 1955  MRN: [id number]  Date of Service: 2024    Discharge Recommendations:  24 hour supervision or assist   PT Equipment Recommendations  Equipment Needed: No      Patient Diagnosis(es): There were no encounter diagnoses.  Past Medical History:  has a past medical history of IFG (impaired fasting glucose), Shoulder pain, right, Symptomatic stenosis of right carotid artery, and Varicose vein of leg.  Past Surgical History:  has no past surgical history on file.    Assessment   Assessment: pt is a 67 yo female who presents with resolved LUE weakness and decreased sensation, no neuro deficits involving LEs. CT head shows R parietal acute stroke. CTA head/neck shows R cervical ICA stenosis with intraluminal thrombus. pt from home with dgt and IND/active at baseline without AD, typically caring for 2 year old grandchild. pt demonstrates limited deficits on eval, mild strength and coordination deficits noted in LUE however now functional and improving quickly. pt performing mobility in edwards and room without AD with supervision, demonstrating steady gait and no LOB. pt and dgt expressing no concerns for return home, educated on continued exercise and use of LUE to promote return to baseline. No further acute PT needs demonstrated, PT to sign off. Recommend initial 24hr supervision for safety.  Therapy Prognosis: Good  Decision Making: Low Complexity  Requires PT Follow-Up: No  Activity Tolerance  Activity Tolerance: Patient tolerated evaluation without incident     Plan   Physical Therapy Plan  General Plan: Discharge with evaluation only  Safety Devices  Type of Devices: Call light within reach, Chair alarm in place, Left in chair, Gait belt, Nurse notified     Restrictions  Position Activity Restriction  Other position/activity restrictions: up as tolerated     Subjective  to a chair?: None  How much help is needed standing up from a chair using your arms?: None  How much help is needed walking in hospital room?: None  How much help is needed climbing 3-5 steps with a railing?: None  AM-PAC Inpatient Mobility Raw Score : 24  AM-PAC Inpatient T-Scale Score : 61.14  Mobility Inpatient CMS 0-100% Score: 0  Mobility Inpatient CMS G-Code Modifier : CH         Tinneti Score       Goals  Short Term Goals  Time Frame for Short Term Goals: no acute PT goals to be addressed       Education  Patient Education  Education Given To: Patient;Family  Education Provided: Role of Therapy;Plan of Care;Fall Prevention Strategies;Family Education  Education Method: Demonstration;Verbal  Barriers to Learning: None  Education Outcome: Verbalized understanding      Therapy Time   Individual Concurrent Group Co-treatment   Time In 1028         Time Out 1109         Minutes 41              Timed Code Treatment Minutes:  26 min     Total Treatment Minutes:  41 min        Christi Buitrago PT

## 2024-01-23 NOTE — PLAN OF CARE
Problem: Discharge Planning  Goal: Discharge to home or other facility with appropriate resources  1/23/2024 1843 by Nanette Vásquez RN  Outcome: Progressing  Flowsheets  Taken 1/23/2024 1600  Discharge to home or other facility with appropriate resources: Identify barriers to discharge with patient and caregiver  Taken 1/23/2024 1200  Discharge to home or other facility with appropriate resources: Identify barriers to discharge with patient and caregiver  Taken 1/23/2024 0737  Discharge to home or other facility with appropriate resources: Identify barriers to discharge with patient and caregiver  1/23/2024 0645 by Marisel Dominguez RN  Outcome: Progressing  Flowsheets (Taken 1/22/2024 2000)  Discharge to home or other facility with appropriate resources: Identify barriers to discharge with patient and caregiver     Problem: Chronic Conditions and Co-morbidities  Goal: Patient's chronic conditions and co-morbidity symptoms are monitored and maintained or improved  1/23/2024 1843 by Nanette Vásquez RN  Outcome: Progressing  Flowsheets  Taken 1/23/2024 1600  Care Plan - Patient's Chronic Conditions and Co-Morbidity Symptoms are Monitored and Maintained or Improved: Monitor and assess patient's chronic conditions and comorbid symptoms for stability, deterioration, or improvement  Taken 1/23/2024 1200  Care Plan - Patient's Chronic Conditions and Co-Morbidity Symptoms are Monitored and Maintained or Improved: Monitor and assess patient's chronic conditions and comorbid symptoms for stability, deterioration, or improvement  Taken 1/23/2024 0737  Care Plan - Patient's Chronic Conditions and Co-Morbidity Symptoms are Monitored and Maintained or Improved: Monitor and assess patient's chronic conditions and comorbid symptoms for stability, deterioration, or improvement  1/23/2024 0645 by Marisel Dominguez RN  Outcome: Progressing  Flowsheets (Taken 1/22/2024 2000)  Care Plan - Patient's Chronic Conditions and Co-Morbidity

## 2024-01-23 NOTE — PROGRESS NOTES
NEUROSURGERY PROGRESS NOTE    LEYDI COLLADO   [id number]   1955 1/23/2024    Interval History: NAEO, anti Xa 0.6 this morning   Hospital Day #1    Subjective: resting in bed w/ daughter at bedside. Denies headache, says her left arm feels almost back to normal but slight sensory changes still present     Objective:  /68   Pulse 61   Temp 98 °F (36.7 °C) (Temporal)   Resp 21   Ht 1.651 m (5' 5\")   Wt 86.3 kg (190 lb 4.1 oz)   SpO2 96%   BMI 31.66 kg/m²     Labs:  Recent Labs     01/22/24  0951         CO2 23   BUN 17   CREATININE 0.9   GLUCOSE 137*     Recent Labs     01/22/24  0951 01/22/24  1825 01/23/24  0606   WBC 7.2 9.2 8.1   RBC 4.81 4.97 4.47   INR 0.96 0.97  --      Radiological Findings:  CT head wo contrast  1/22/24  IMPRESSION:  1. Loss of gray-white matter differentiation within the right parietal lobe  concerning for an acute infarct.     CTA head neck w contrast  1/22/24  IMPRESSION:  1. Severe atherosclerotic disease of the proximal right cervical internal  carotid artery resulting in 90% diameter stenosis.  Small pedunculated  intraluminal thrombus is present at this location.  2. Small acute infarct within the right frontal parietal lobe.  3. No intracranial large vessel occlusion or aneurysm.    Physical Exam:  Neurological:  Mental Status: Awake, alert, oriented x 4   Attention: Intact  Language: No aphasia or dysarthria noted  Sensation: mild sensory changes to LUE  Coordination: Intact     Cranial Nerves:  Cranial Nerves:  II: Visual acuity not tested, denies new visual changes / diplopia  III, IV, VI: PERRL, 3 mm bilaterally, EOMI, no nystagmus noted  V: Facial sensation intact bilaterally to touch  VII: Face symmetric  VIII: Hearing intact bilaterally to spoken voice  IX: Palate movement equal bilaterally  XI: Shoulder shrug equal bilaterally  XII: Tongue midline     Musculoskeletal:   Gait: Not tested   Assist devices: None   Tone: normal   Motor  strength:     Right  Left      Right  Left    Deltoid  5 5   Hip Flex  5 5   Biceps  5 5   Knee Extensors  5 5   Triceps  5 5   Knee Flexors  5 5   Wrist Ext  5 5   Ankle Dorsiflex.  5 5   Wrist Flex  5 5   Ankle Plantarflex.  5 5   Handgrip  5 5   Ext Jony Longus  5 5   Thumb Ext  5 5              No pronator drift on exam     Assessment:  69 yo female who presents w/ PMH CKD, HTN who presents with wake up left arm weakness and 2 day history of left sensory changes.     CTA w/ 90% cervical ICA stenosis and intraluminal thrombus. CT head wo contrast concerning for acute right parietal stroke.      MRI brain reveals acute infarction in right parietal lobe cortex and superficial watershed territories at the right frontal lobe and occipital temporal lobe     Plan:  No emergent neurosurgical intervention indicated  Continue neuro protocol heparin drip  -CTA head/neck 5 days from initiation of heparin drip ( Saturday 1/27)   -CT head wo contrast after 2 therapeutic anti Xa  Continue q 1 hour neuro checks for now    NCC following   Activity/diet per primary team  Will follow, please call with questions     DISPO- Dispo timing to be determined by primary team once patient is medically stable for discharge.     DEYANIRA Babcock-CNP  Neurosurgery Nurse Practitioner  [phone]    Patient was discussed with Dr. Soto who agrees with above assessment and plan.     Electronically signed by: DEYANIRA Wise NP, 1/23/2024 11:19 AM

## 2024-01-23 NOTE — PLAN OF CARE
Brief note:  Angelia Wilson is a 67 yo female who presents with resolved LUE weakness and decreased sensation. CT head shows R parietal acute stroke. CTA head/neck shows R cervical ICA stenosis with intraluminal thrombus. Patient started on heparin infusion at OSH. On exam, patient is neurologically intact, with no focal deficits. Concern for stroke 2/2 intraluminal thrombus and stenosis       services used  Session ID#: [id number]   name: Isaias Raya) [id number]      Patient denies pain, h/a, dizziness, numbness, paresthesias, dysphagia, dysarthria or visual disturbances including vision loss or diplopia.  Does report that she feels like her left arm feels \"dead.\" When asked to clarify further, she states it subjectively  feels weaker compared to her right arm. No LLE weakness associated with it.       PHYSICAL EXAM:  Vitals:    01/22/24 1700 01/22/24 1707 01/22/24 1800 01/22/24 2000   BP:  115/77 (!) 125/103    Pulse: 57 78 68    Resp: 19 20 13    Temp:    98 °F (36.7 °C)   TempSrc:    Temporal   SpO2: 93% 100% 99% 96%   Weight:       Height:             General: Alert, no distress, well-nourished  Neurologic  Mental status:   orientation to person, place, time, situation   Attention intact as able to attend well to the exam     Language fluent in conversation   Comprehension intact; follows simple commands    Cranial nerves:   CN2: Visual fields full w/o extinction on confrontational testing   CN 3,4,6: Pupils equal and reactive to light, extraocular muscles intact  CN5: Facial sensation symmetric   CN7: Face symmetric  CN8: Hearing symmetric to spoken voice  CN9: Palate elevated symmetrically  CN11: Traps full strength on shoulder shrug  CN12: Tongue midline with protrusion    Motor Exam:  5/5 strength throughout all four extremities.    NIH: 0    Sensory: light touch intact and symmetric in all 4 extremities.  No sensory extinction on bilateral simultaneous stimulation  Cerebellar/coordination:

## 2024-01-23 NOTE — PROGRESS NOTES
Occupational Therapy  Facility/Department: [facility] ICU  Occupational Therapy Initial Assessment & DC     Name: Rosalba Wilson  : 1955  MRN: [id number]  Date of Service: 2024    Discharge Recommendations:  Home with assist PRN  24 hour supervision or assist initially           Patient Diagnosis(es): There were no encounter diagnoses.  Past Medical History:  has a past medical history of IFG (impaired fasting glucose), Shoulder pain, right, Symptomatic stenosis of right carotid artery, and Varicose vein of leg.  Past Surgical History:  has no past surgical history on file.    Treatment Diagnosis: imp mob, tf, ADL      Assessment   Assessment: From home with daughter, has assist at dc. Admit with CVA L side weakness. Pt reporting some slight weakness in LUE otherwise resolved. Pt completing mobility with SPVN this date, steady on  her feet no LOB, mobility in room, bathroom, hallway, stairs. Completing LB dressing at times in stance with SBA to [facility]. Edu about mobility of LUE and cont to use for bilat UE activities. Pt and daughter, demo/verb understanding. No acute OT needs at this time.  Treatment Diagnosis: imp mob, tf, ADL  Decision Making: Low Complexity  REQUIRES OT FOLLOW-UP: No  Activity Tolerance  Activity Tolerance: Patient Tolerated treatment well        Plan   Occupational Therapy Plan  Times Per Week: dc     Restrictions  Position Activity Restriction  Other position/activity restrictions: up as tolerated    Subjective   General  Chart Reviewed: Yes  Additional Pertinent Hx: 68 y.o. female with a past medical history of hypertension, hyperlipidemia who presents with Acute CVA (cerebrovascular accident). presents with resolved LUE weakness and decreased sensation. CT head shows R parietal acute stroke. CTA head/neck shows R cervical ICA stenosis with intraluminal thrombus.  Referring Practitioner: Jennifer Ro MD  Diagnosis: CVA  Subjective  Subjective: In bed agreeable to session,  daughter present for session     Social/Functional History  Social/Functional History  Lives With: Daughter  Type of Home: House  Home Layout: Two level, Bed/Bath upstairs (no bathroom on first floor)  Home Access: Stairs to enter with rails  Entrance Stairs - Number of Steps: 5  Bathroom Shower/Tub: Tub/Shower unit, None  Bathroom Toilet: Standard  Home Equipment: Walker, standard  Has the patient had two or more falls in the past year or any fall with injury in the past year?: Yes  Receives Help From: Family  ADL Assistance: Independent  Homemaking Assistance: Independent (dgt does most of it)  Homemaking Responsibilities: Yes  Ambulation Assistance: Independent  Transfer Assistance: Independent  Active : No  Patient's  Info: dgt       Objective   Pulse: 61  Heart Rate Source: Monitor  BP: 104/68  BP Location: Left upper arm  BP Method: Automatic  Patient Position: Semi fowlers  MAP (Calculated): 80  Respirations: 21  SpO2: 96 %  O2 Device: None (Room air)  Temp: 98 °F (36.7 °C)             Safety Devices  Type of Devices: Call light within reach;Chair alarm in place;Left in chair;Gait belt;Nurse notified  Bed Mobility Training  Bed Mobility Training: Yes  Supine to Sit: Modified independent  Balance  Sitting: Intact  Standing: Intact  Transfer Training  Transfer Training: Yes  Sit to Stand: Supervision  Stand to Sit: Supervision  Gait  Gait Training: Yes  Overall Level of Assistance: Supervision (mobility in room, bathroom, hallway, stairs, no LOB noted, no AD)  Assistive Device: Gait belt     AROM: Generally decreased, functional (LUE slighly weaker decreased coodination though still able to complete BUE ADLs)  Strength: Generally decreased, functional  ADL  Feeding: Independent  Grooming: Modified independent   Grooming Skilled Clinical Factors: stance at sink, oral care, wash face, apply lotion, brush hair and put up with hair tie  LE Dressing Skilled Clinical Factors: Mod Ind  in seated SBA to

## 2024-01-23 NOTE — DISCHARGE INSTRUCTIONS
[facility] Stroke Program Survey  The [facility] [place] values your feedback related to your recent hospital visit and admission. We strive to improve our Neuroscience program to promote better outcomes and recoveries for all our patients.  The anonymous survey below consists of a few questions that are related to your stay and around your Stroke diagnosis, treatment, and recovery. It is anonymous and has only a few questions.  The estimated length of time needed to complete this survey is 3 minutes or less. Thank you for completing this survey!

## 2024-01-23 NOTE — PROGRESS NOTES
Speech Language Pathology  Facility/Department:[facility] ICU  Initial Evaluation                                                       Name: Rosalba Wilson  : 1955  MRN: [id number]    Patient Diagnosis(es):   Patient Active Problem List    Diagnosis Date Noted    Symptomatic stenosis of right carotid artery 2024    Cerebrovascular accident (CVA) due to embolism of right middle cerebral artery (HCC) 2024    Leg pain 2023    Varicose vein of leg 2015    IFG (impaired fasting glucose)     Shoulder pain, right     Blurred vision 2015    Cataract of both eyes 2015       Past Medical History:   Diagnosis Date    IFG (impaired fasting glucose)     Shoulder pain, right     Symptomatic stenosis of right carotid artery 2024    Varicose vein of leg 2015     No past surgical history on file.    Reason for Referral:  Rosalba Wilson  was referred for a Speech Therapy evaluation to assess speech and swallow function.    History of Present Illness  Per admitting H&P: 2024  '68 y.o. female with a past medical history of hypertension, hyperlipidemia who presents with Acute CVA.'    CXR: 2024  1. Bibasilar atelectasis and opacification of the retrocardiac left lung,  also favored to represent atelectasis.  Correlate clinically.  The lungs  otherwise clear.  2. The cardiac silhouette appears enlarged, may be projectional in nature.    CT head: 2024  1. Loss of gray-white matter differentiation within the right parietal lobe  concerning for an acute infarct.    MRI brain: 2024  1. Patchy areas of acute infarction involving right parietal lobe cortex and subcortical white matter and to a lesser extent the superficial watershed territories at the right frontal lobe and occipital temporal lobe compatible with thromboembolic   disease.  2. No intracranial hemorrhage  3. Mild chronic small vessel ischemia and atrophy    Date of onset:  01/22/2024    Current Diet:  ADULT DIET; Regular    Treatment Diagnosis: dysphagia    Pain:  None indicated by any means    General:  Chart reviewed: Yes  Behavior/Cognition: awake, alert, pleasant  Communication Observation: verbal; requires , speaks Citizen of [place]/Bosnian, minimal English  Follows Directions: yes, simple  Dentition/Oral Mucosa: wears dentures  Oral motor exam: WFL  Vocal quality: WFL  Respiratory Status/oxygen requirements: WFL  Vision/Hearing: wears glasses  Patient Complaint: did not state  Patient Positioning: upright in bed  PLOF: from with daughter and son-in-law; independent at baseline, consumes regular texture solids    Prior Dysphagia History:   None found in chart review    Bedside Swallowing Evaluation Impression 1/23/2024  Swallow function appears grossly intact for PO trials assessed (ice chips, thins via tsp/straw/3 oz sequential, regular solid), with pt demonstrating appropriate oral prep/containment, timely mastication, positive swallow movement, good oral clearance, no overt s/s aspiration associated with PO (single very delayed cough present x 1). Recommend continue current diet as tolerated. Will plan to f/u x1.    Speech, Language, Cognition 1/23/2024  Utilized ; natural limitations to this cognitive-linguistic assessment d/t language barrier. No dysarthria or aphasia apparent, with pt answering questions, conversing seemingly fluently at the conversation level, following directions, naming common items. Oriented x 3, demonstrated good recall.     Education  Purpose of visit, role of SLP, speech/swallow function. Pt verbalized comprehension.    Prognosis:  Prognosis for improvement: good  Barriers to reach goals: acute CVA  Consulted and agree with results and recommendations: patient/RN    Treatment:  Dysphagia Goals:  The pt will be seen 1-2 x to address the following goals:  Goal 1: Patient will tolerate least restrictive diet with adequate oral prep and

## 2024-01-23 NOTE — PROGRESS NOTES
Current NIHSS 1    Nursing Core Measures for Stroke:   [x]   Education template documentation (STROKE/TIA). Please select only risk factors that are applicable to patient when selecting risk factors.  [x]   Care Plan template documentation (Physiologic Instability - Neurosensory). Selecting this will add care plan rows to the flowsheet under the Neuro section of Head to Toe.  [x]   Verified Swallow Screen completed prior to PO intake of food, drink, medications>          Please verify correct medication route prior to administration for intubated patients, patients who can not swallow or have alternative routes of intake (NG, OG, IN), etc  [x]   VTE Prophylaxis: SCDs ordered/addressed; SCDs: N/A Heparin           (As a reminder, ASA, Plavix, and TPA/TNK are not VTE prophylaxis.)    Reviewed the Following Education with Patient and/or Family:   - Personalized risk factors for patient, along with changes, modifications that will help prevent stroke.  - Signs and Symptoms of Stroke: (Facial droop, weakness/numbness especially on one side, speech difficulty, sudden confusion, sudden loss of vision, sudden severe headache, sudden loss of balance or having difficulty walking, syncope, or seizure)  - How to activate EMS (911)   - Importance of Follow Up Appointments at Discharge   - Importance of Compliance with Medications Prescribed at Discharge  - Available community resources and stroke advocacy groups if needed    Patient and/or family member: verbalized understanding.     Stroke Education booklet given to patient/family (or verified, if given already), which reviews above information. yes         Electronically signed by Marisel Dominguez RN on 1/23/2024 at 6:45 AM

## 2024-01-23 NOTE — PROGRESS NOTES
RAC IV is working but is bleeding from the site.  Doesn't want to clot.  Will speak to charge regarding options.  Pt needs 2nd IV site.  Hard stick.  Awaiting placement.

## 2024-01-23 NOTE — ED PROVIDER NOTES
EMERGENCY DEPARTMENT SUPERVISING PHYSICIAN NOTE    I have seen this patient & have reviewed history and findings with the PA, NP, or resident physician and provided direct personal supervision as needed. I was present for key portions of any procedures performed. I concur with their assessment and plans except for any discrepancies noted below. I've participated in medical management, monitoring, and treatment of this patient with the provider. I have reviewed documentation, test results, and laboratory results in the interim. Care plan has been developed collaboratively.    Rosalba Wilson is a 68 y.o. year old female presenting to the emergency department for Extremity Weakness (Left arm weakness onset 2 days ago.  Pt denies any other symptoms, denies any known injury to left arm.  No Hx of CVA.  )      Brief HPI:  68yrs F reports that she has had numbness to left arm ongoing for about 48 hours  Symptoms worsened substantially upon waking this AM as she noticed new onset of profound L arm weakness    Pertinent Exam Findings:  Awake, alert, not distressed  Left arm is nearly flaccid  Decreased sensation to light touch LUE only  Moving other 3 extremities well    Plan:  Has severe R ICA stenosis and appears to have a thrombus in this area as well  R parietal infarct noted on CT head  [place] consulted with neurosurgery and endovascular interventionalist  They are recommending initating anticoagulation with heparin and transfer to their neuro ICU  Agree that this seems like an appropriate course of action    Final Impression(s)  1. Acute ischemic stroke (HCC)    2. Internal carotid artery stenosis, right    3. Carotid artery thrombosis, right         Clint Camargo MD  01/22/24 2052

## 2024-01-23 NOTE — PLAN OF CARE
Problem: Discharge Planning  Goal: Discharge to home or other facility with appropriate resources  Outcome: Progressing  Flowsheets (Taken 1/22/2024 2000)  Discharge to home or other facility with appropriate resources: Identify barriers to discharge with patient and caregiver     Problem: Chronic Conditions and Co-morbidities  Goal: Patient's chronic conditions and co-morbidity symptoms are monitored and maintained or improved  Outcome: Progressing  Flowsheets (Taken 1/22/2024 2000)  Care Plan - Patient's Chronic Conditions and Co-Morbidity Symptoms are Monitored and Maintained or Improved: Monitor and assess patient's chronic conditions and comorbid symptoms for stability, deterioration, or improvement     Problem: Neurosensory - Adult  Goal: Achieves stable or improved neurological status  Outcome: Progressing  Flowsheets (Taken 1/22/2024 2000)  Achieves stable or improved neurological status: Assess for and report changes in neurological status  Goal: Achieves maximal functionality and self care  Outcome: Progressing  Flowsheets (Taken 1/22/2024 2000)  Achieves maximal functionality and self care: Monitor swallowing and airway patency with patient fatigue and changes in neurological status     Problem: ABCDS Injury Assessment  Goal: Absence of physical injury  Outcome: Progressing  Flowsheets (Taken 1/22/2024 2000)  Absence of Physical Injury: Implement safety measures based on patient assessment

## 2024-01-23 NOTE — PROGRESS NOTES
NEUROCRITICAL CARE PROGRESS NOTE       Patient Name: Rosalba Wilson YOB: 1955   Sex: Female Age: 68 yrs     CC / Reason for Consult: stroke    Changes over last 24 hours:   No exam changes. Anti Xa at 0.6 this morning.   MRI done showing patchy areas of acute stroke involving the R parietal lobe cortex and subcortical white matter with watershed infarcts in the R frontal lobe and occipital temporal lobe.    HgbA1c - 5.9. Echo unable to show bubble study.     ROS: No complaints at this time.     ASSESSMENT & RECOMMENDATIONS   Assessment:  Angelia Wilson is a 67 yo female who presented for resolved LUE weakness and decreased sensation. Patient started on heparin infusion at OSH. On exam, patient is neurologically intact, with no focal deficits. MRI done showing patchy areas of acute stroke in the R parietal lobe and watershed stroke in the R frontal, occipital, temporal lobe.     Plan:  -Lipid panel pending  -continue high-intensity statin   -hold ASA for now  -SCDs for DVT prophylaxis  -low dose, no bolus neuro heparin gtt for thrombus, repeat CT head once therapeutic  -repeat CTA head/neck in 5 days per neurosurgery     Consults / Nursing Care  -HOB elevated to help prevent aspiration  -Q1H Neurologic Exams & Vitals  -NIHSS per guidelines   -Telemetry   -PT/OT: eval and treat  -PMR consult if rehab recommended   -ST: eval and treat and dysphagia screen  -Nursing bedside swallow prior to any PO intake   -Blood Pressure Management              -SBP goal <160     Follow up / Discharge Recommendations:  -If no obvious source of stroke identified will need 30 day event monitor arranged at discharge  -Stroke Education at Discharge  -Follow up w/ Neurology in 3 months       DEYANIRA Christopher - CNP   Neurocritical Care   1/23/2024 11:21 AM  [employer]: [facility]  HISTORY   Interval History: MRI completed overnight showing patchy acute infarct in the R parietal lobe and watershed  infarcts in the R frontal, occipital, and temporal lobes. No exam changes. Heparin infusion running.    PMH Past Medical History:   Diagnosis Date    IFG (impaired fasting glucose)     Shoulder pain, right     Symptomatic stenosis of right carotid artery 01/22/2024    Varicose vein of leg 07/23/2015      Allergies No Known Allergies   Diet ADULT DIET; Regular   Isolation No active isolations     CURRENT SCHEDULED MEDICATIONS   Inpatient Medications     sodium chloride flush, 5-40 mL, IntraVENous, 2 times per day    atorvastatin, 80 mg, Oral, Nightly   Infusions    heparin (PORCINE) Infusion 14 Units/kg/hr (01/23/24 1111)    sodium chloride        Antibiotics   Recent Abx Admin        No antibiotic orders with administrations found.                      LABS   Metabolic Panel Recent Labs     01/22/24  0951      K 5.2*      CO2 23   BUN 17   CREATININE 0.9   GLUCOSE 137*   CALCIUM 9.2   LABALBU 4.2   ALKPHOS 76   ALT 14   AST 27      CBC / Coags Recent Labs     01/22/24  0951 01/22/24  1825 01/23/24  0606   WBC 7.2 9.2 8.1   RBC 4.81 4.97 4.47   HGB 13.9 14.2 12.9   HCT 41.5 45.5 39.1    185 163   INR 0.96 0.97  --       Other Recent Labs     01/23/24  0606   LABA1C 5.9     No results for input(s): \"PHENYTOIN\", \"KEPPRA\", \"LACOSA\", \"LAMO\", \"VALPROATE\", \"LACTSEPSIS\", \"LACTA\" in the last 72 hours.     DIAGNOSTICS   IMAGES:  Images personally reviewed and agree w/ radiology interpretation of:    MRI Brain w/o Contrast:  IMPRESSION:  1. Patchy areas of acute infarction involving right parietal lobe cortex and subcortical white matter and to a lesser extent the superficial watershed territories at the right frontal lobe and occipital temporal lobe compatible with thromboembolic   disease.  2. No intracranial hemorrhage  3. Mild chronic small vessel ischemia and atrophy    Previous Imaging:  Head CT w/o Contrast:  IMPRESSION:  1. Loss of gray-white matter differentiation within the right parietal

## 2024-01-24 LAB
ANION GAP SERPL CALCULATED.3IONS-SCNC: 11 MMOL/L (ref 3–16)
ANTI-XA UNFRAC HEPARIN: 0.38 IU/ML (ref 0.3–0.7)
BUN SERPL-MCNC: 17 MG/DL (ref 7–20)
CALCIUM SERPL-MCNC: 8.7 MG/DL (ref 8.3–10.6)
CHLORIDE SERPL-SCNC: 106 MMOL/L (ref 99–110)
CO2 SERPL-SCNC: 23 MMOL/L (ref 21–32)
CREAT SERPL-MCNC: 1 MG/DL (ref 0.6–1.2)
DEPRECATED RDW RBC AUTO: 16 % (ref 12.4–15.4)
GFR SERPLBLD CREATININE-BSD FMLA CKD-EPI: >60 ML/MIN/{1.73_M2}
GLUCOSE SERPL-MCNC: 117 MG/DL (ref 70–99)
HCT VFR BLD AUTO: 37.5 % (ref 36–48)
HGB BLD-MCNC: 12.5 G/DL (ref 12–16)
MCH RBC QN AUTO: 28.5 PG (ref 26–34)
MCHC RBC AUTO-ENTMCNC: 33.2 G/DL (ref 31–36)
MCV RBC AUTO: 85.8 FL (ref 80–100)
PLATELET # BLD AUTO: 156 K/UL (ref 135–450)
PMV BLD AUTO: 9.4 FL (ref 5–10.5)
POTASSIUM SERPL-SCNC: 4.2 MMOL/L (ref 3.5–5.1)
RBC # BLD AUTO: 4.38 M/UL (ref 4–5.2)
SODIUM SERPL-SCNC: 140 MMOL/L (ref 136–145)
WBC # BLD AUTO: 7.1 K/UL (ref 4–11)

## 2024-01-24 PROCEDURE — 99231 SBSQ HOSP IP/OBS SF/LOW 25: CPT | Performed by: NURSE PRACTITIONER

## 2024-01-24 PROCEDURE — 92526 ORAL FUNCTION THERAPY: CPT

## 2024-01-24 PROCEDURE — 85027 COMPLETE CBC AUTOMATED: CPT

## 2024-01-24 PROCEDURE — 80061 LIPID PANEL: CPT

## 2024-01-24 PROCEDURE — 6370000000 HC RX 637 (ALT 250 FOR IP): Performed by: INTERNAL MEDICINE

## 2024-01-24 PROCEDURE — 85520 HEPARIN ASSAY: CPT

## 2024-01-24 PROCEDURE — 2000000000 HC ICU R&B

## 2024-01-24 PROCEDURE — 36415 COLL VENOUS BLD VENIPUNCTURE: CPT

## 2024-01-24 PROCEDURE — 2580000003 HC RX 258: Performed by: INTERNAL MEDICINE

## 2024-01-24 PROCEDURE — 80048 BASIC METABOLIC PNL TOTAL CA: CPT

## 2024-01-24 PROCEDURE — 2580000003 HC RX 258

## 2024-01-24 PROCEDURE — 92523 SPEECH SOUND LANG COMPREHEN: CPT

## 2024-01-24 RX ADMIN — SODIUM CHLORIDE, PRESERVATIVE FREE 10 ML: 5 INJECTION INTRAVENOUS at 21:46

## 2024-01-24 RX ADMIN — ATORVASTATIN CALCIUM 80 MG: 80 TABLET, FILM COATED ORAL at 21:45

## 2024-01-24 RX ADMIN — SODIUM CHLORIDE, SODIUM GLUCONATE, SODIUM ACETATE, POTASSIUM CHLORIDE AND MAGNESIUM CHLORIDE 75 ML/HR: 526; 502; 368; 37; 30 INJECTION, SOLUTION INTRAVENOUS at 04:12

## 2024-01-24 NOTE — PLAN OF CARE
Brief note    No complaints  Noncon HCT ordered now that patient is therapeutic on neuro heparin gtt x 2    ADDENDUM:  No ICH on HCT    PHYSICAL EXAM:  Vitals:    01/24/24 0308 01/24/24 0400 01/24/24 0500 01/24/24 0600   BP: 96/62 137/80 (!) 94/54 107/69   Pulse: 80 72 65 64   Resp: 16 16 22 18   Temp:  97.4 °F (36.3 °C)     TempSrc:  Temporal     SpO2: 95% 94% 91% 92%   Weight:    88.8 kg (195 lb 12.3 oz)   Height:             General: Alert, no distress, well-nourished  Neurologic  Mental status:   orientation to person, place, time, situation   Attention intact as able to attend well to the exam     Language fluent in conversation   Comprehension intact; follows simple commands    Cranial nerves:   CN2: Visual fields full w/o extinction on confrontational testing  CN 3,4,6: Pupils equal and reactive to light, extraocular muscles intact  CN5: Facial sensation symmetric   CN7: Face symmetric  CN8: Hearing symmetric to spoken voice  CN9: Palate elevated symmetrically  CN11: Traps full strength on shoulder shrug  CN12: Tongue midline with protrusion    Motor Exam:  5/5 strength throughout all four extremities.       Sensory: light touch intact and symmetric in all 4 extremities.  No sensory extinction on bilateral simultaneous stimulation  Cerebellar/coordination: finger nose finger normal without ataxia  Tone: normal in all 4 extremities  Gait: Strong and steady      OTHER SYSTEMS:  Cardiovascular: Warm, appears well perfused   Respiratory: Easy, non-labored respiratory pattern   Abdominal: Abdomen is without distention   Extremities: Upper and lower extremities are atraumatic in appearance without deformity. No swelling or erythema.

## 2024-01-24 NOTE — PROGRESS NOTES
weakness    PMH Past Medical History:   Diagnosis Date    IFG (impaired fasting glucose)     Shoulder pain, right     Symptomatic stenosis of right carotid artery 01/22/2024    Varicose vein of leg 07/23/2015      Allergies No Known Allergies   Diet ADULT DIET; Regular   Isolation No active isolations     CURRENT SCHEDULED MEDICATIONS   Inpatient Medications     sodium chloride flush, 5-40 mL, IntraVENous, 2 times per day    atorvastatin, 80 mg, Oral, Nightly   Infusions    electrolyte-A 75 mL/hr (01/24/24 0412)    heparin (PORCINE) Infusion 14 Units/kg/hr (01/23/24 2200)    sodium chloride        Antibiotics   Recent Abx Admin        No antibiotic orders with administrations found.                      LABS   Metabolic Panel Recent Labs     01/22/24  0951 01/24/24  0429    140   K 5.2* 4.2    106   CO2 23 23   BUN 17 17   CREATININE 0.9 1.0   GLUCOSE 137* 117*   CALCIUM 9.2 8.7   LABALBU 4.2  --    ALKPHOS 76  --    ALT 14  --    AST 27  --       CBC / Coags Recent Labs     01/22/24  0951 01/22/24  1825 01/23/24  0606 01/24/24  0429   WBC 7.2 9.2 8.1 7.1   RBC 4.81 4.97 4.47 4.38   HGB 13.9 14.2 12.9 12.5   HCT 41.5 45.5 39.1 37.5    185 163 156   INR 0.96 0.97  --   --       Other Recent Labs     01/23/24  0606   LABA1C 5.9     No results for input(s): \"PHENYTOIN\", \"KEPPRA\", \"LACOSA\", \"LAMO\", \"VALPROATE\", \"LACTSEPSIS\", \"LACTA\" in the last 72 hours.     DIAGNOSTICS   IMAGES:  Images personally reviewed and agree w/ radiology interpretation of:    MRI Brain w/o Contrast:  IMPRESSION:  1. Patchy areas of acute infarction involving right parietal lobe cortex and subcortical white matter and to a lesser extent the superficial watershed territories at the right frontal lobe and occipital temporal lobe compatible with thromboembolic   disease.  2. No intracranial hemorrhage  3. Mild chronic small vessel ischemia and atrophy    Previous Imaging:  CT Head WO contrast: 1/23/24  FINDINGS:     No acute  and symmetric in all 4 extremities.  No sensory extinction on bilateral simultaneous stimulation  Cerebellar/coordination: finger nose finger normal without ataxia  Tone: normal in all 4 extremities  Gait: held 2/2 patient safety    OTHER SYSTEMS:  Cardiovascular: Warm, appears well perfused   Respiratory: Easy, non-labored respiratory pattern   Abdominal: Abdomen is without distention   Extremities: Upper and lower extremities are atraumatic in appearance without deformity. No swelling or erythema     Critical Care:  Due to the immediate potential for life-threatening deterioration due to neurological failure, I spent 40 minutes providing critical care.  This time excludes time spent performing procedures but includes time spent on direct patient care, history retrieval, review of the chart, and discussions with patient, family, and consultant(s).

## 2024-01-24 NOTE — PROGRESS NOTES
BRAIN/VENTRICLES: Motion artifact degrades image quality.  There is no acute intracerebral hemorrhage or extra-axial fluid collection.  There is mild cerebral atrophy with mild periventricular white matter small vessel ischemic disease. However, there is loss of gray-white matter differentiation within the right parietal lobe concerning for an acute infarct. ORBITS: The orbits are unremarkable. SINUSES: There is partial opacification of the left mastoid air cells. SOFT TISSUES/SKULL:  The calvarium is intact.     1. Loss of gray-white matter differentiation within the right parietal lobe concerning for an acute infarct. Findings were discussed with TERRY PERKINS at 10:24 am on 1/22/2024.       CBC:   Recent Labs     01/22/24  1825 01/23/24  0606 01/24/24  0429   WBC 9.2 8.1 7.1   HGB 14.2 12.9 12.5    163 156     BMP:    Recent Labs     01/22/24  0951 01/24/24  0429    140   K 5.2* 4.2    106   CO2 23 23   BUN 17 17   CREATININE 0.9 1.0   GLUCOSE 137* 117*     Hepatic:   Recent Labs     01/22/24  0951   AST 27   ALT 14   BILITOT 0.6   ALKPHOS 76     Lipids: No results found for: \"CHOL\", \"HDL\", \"TRIG\"  Hemoglobin A1C:   Lab Results   Component Value Date/Time    LABA1C 5.9 01/23/2024 06:06 AM     TSH:   Lab Results   Component Value Date/Time    TSH 1.76 07/01/2015 04:41 PM     Troponin: No results found for: \"TROPONINT\"  Lactic Acid: No results for input(s): \"LACTA\" in the last 72 hours.  BNP: No results for input(s): \"PROBNP\" in the last 72 hours.  UA:  Lab Results   Component Value Date/Time    NITRU POSITIVE 04/04/2023 05:05 AM    COLORU Yellow 04/04/2023 05:05 AM    PHUR 5.5 04/04/2023 05:05 AM    WBCUA 18 04/04/2023 05:05 AM    RBCUA 1 04/04/2023 05:05 AM    MUCUS 1+ 01/05/2011 01:55 PM    BACTERIA 4+ 04/04/2023 05:05 AM    CLARITYU Clear 04/04/2023 05:05 AM    SPECGRAV 1.023 04/04/2023 05:05 AM    LEUKOCYTESUR SMALL 04/04/2023 05:05 AM    UROBILINOGEN 0.2 04/04/2023 05:05 AM    BILIRUBINUR  Negative 04/04/2023 05:05 AM    BILIRUBINUR NEGATIVE 01/05/2011 01:55 PM    BLOODU Negative 04/04/2023 05:05 AM    GLUCOSEU Negative 04/04/2023 05:05 AM    GLUCOSEU NEGATIVE 01/05/2011 01:55 PM    KETUA Negative 04/04/2023 05:05 AM     Urine Cultures:   Lab Results   Component Value Date/Time    LABURIN >100,000 CFU/ml 04/04/2023 05:05 AM     Blood Cultures:   Lab Results   Component Value Date/Time    BC No Growth after 4 days of incubation. 04/01/2023 11:17 PM     No results found for: \"BLOODCULT2\"  Organism:   Lab Results   Component Value Date/Time    ORG Escherichia coli 04/04/2023 05:05 AM         Electronically signed by Jennifer Ro MD on 1/24/2024 at 8:08 AM

## 2024-01-24 NOTE — CARE COORDINATION
Case Management Assessment           Daily Note                 Date/ Time of Note: 1/24/2024 9:32 AM         Note completed by: Manju Guerrero RN    Patient Name: Rosalba Wilson  YOB: 1955    Diagnosis:Acute CVA (cerebrovascular accident) (HCC) [I63.9]  Patient Admission Status: Inpatient  Date of Admission:1/22/2024  2:00 PM    Length of Stay: 2 GLOS:   Readmission Risk Score: Readmission Risk Score: 7.7    Current Plan of Care: NCC following. Heparin gtt    PT AM-PAC: 24 / 24 per last evaluation on: 1/23    OT AM-PAC: 21 / 24 per last evaluation on: 1/23    Discharge Plan: Plan to return home with family.    Case Management Notes: Pt is from home with her daughter and he family. She is relatively independent with self care an functional mobility at baseline. She was active with [facility] services prior to admission.    Rosalba and her family were provided with choice of provider; she and her family are in agreement with the discharge plan.    Emergency Contacts:  Extended Emergency Contact Information  Primary Emergency Contact: Sandra Hensley  Address: [address] [place] [address] [address] [facility]  Home Phone: [phone]  Work Phone: [phone]  Relation: Child  Preferred language: bosnian  Secondary Emergency Contact: Conchita Wilson  Address: [address] [place] [address] [address] [place] [id number] [place] [address]  Home Phone: [phone]  Work Phone: [phone]  Relation: Spouse      Manju Guerrero RN  [facility]  Case Management Department  [phone]

## 2024-01-24 NOTE — PROGRESS NOTES
Speech Language Pathology  Facility/Department:[facility] ICU  Dysphagia/ Speech-Language/ Cognitive Treatment/ DC Note                                                       Name: Rosalba Wilson  : 1955  MRN: [id number]    Patient Diagnosis(es):   Patient Active Problem List    Diagnosis Date Noted    Symptomatic stenosis of right carotid artery 2024    Cerebrovascular accident (CVA) due to embolism of right middle cerebral artery (HCC) 2024    Leg pain 2023    Varicose vein of leg 2015    IFG (impaired fasting glucose)     Shoulder pain, right     Blurred vision 2015    Cataract of both eyes 2015       Past Medical History:   Diagnosis Date    IFG (impaired fasting glucose)     Shoulder pain, right     Symptomatic stenosis of right carotid artery 2024    Varicose vein of leg 2015     No past surgical history on file.    Reason for Referral:  Rosalba Wilson  was referred for a Speech Therapy evaluation to assess speech and swallow function.    History of Present Illness  Per admitting H&P: 2024  '68 y.o. female with a past medical history of hypertension, hyperlipidemia who presents with Acute CVA.'    CXR: 2024  1. Bibasilar atelectasis and opacification of the retrocardiac left lung,  also favored to represent atelectasis.  Correlate clinically.  The lungs  otherwise clear.  2. The cardiac silhouette appears enlarged, may be projectional in nature.    CT head: 2024  1. Loss of gray-white matter differentiation within the right parietal lobe  concerning for an acute infarct.    MRI brain: 2024  1. Patchy areas of acute infarction involving right parietal lobe cortex and subcortical white matter and to a lesser extent the superficial watershed territories at the right frontal lobe and occipital temporal lobe compatible with thromboembolic   disease.  2. No intracranial hemorrhage  3. Mild chronic small vessel ischemia and  atrophy    Date of onset: 01/22/2024    Current Diet:  ADULT DIET; Regular    Treatment Diagnosis: dysphagia    Pain:  None indicated by any means    General:  Chart reviewed: Yes  Behavior/Cognition: awake, alert, pleasant  Communication Observation: verbal; requires , speaks Chinese/Bosnian, minimal English  Follows Directions: yes, simple  Dentition/Oral Mucosa: wears dentures  Oral motor exam: WFL  Vocal quality: WFL  Respiratory Status/oxygen requirements: WFL  Vision/Hearing: wears glasses  Patient Complaint: did not state  Patient Positioning: upright in bed  PLOF: from with daughter and son-in-law; independent at baseline, consumes regular texture solids    Prior Dysphagia History:   None found in chart review    Bedside Swallowing Evaluation Impression 1/23/2024  Swallow function appears grossly intact for PO trials assessed (ice chips, thins via tsp/straw/3 oz sequential, regular solid), with pt demonstrating appropriate oral prep/containment, timely mastication, positive swallow movement, good oral clearance, no overt s/s aspiration associated with PO (single very delayed cough present x 1). Recommend continue current diet as tolerated. Will plan to f/u x1.    Speech, Language, Cognition 1/23/2024  Utilized ; natural limitations to this cognitive-linguistic assessment d/t language barrier. No dysarthria or aphasia apparent, with pt answering questions, conversing seemingly fluently at the conversation level, following directions, naming common items. Oriented x 3, demonstrated good recall.     Education  Purpose of visit, role of SLP, speech/swallow function. Pt verbalized comprehension.    Prognosis:  Prognosis for improvement: good  Barriers to reach goals: acute CVA  Consulted and agree with results and recommendations: patient/RN    Treatment:  Dysphagia Goals:  The pt will be seen 1-2 x to address the following goals:  Goal 1: Patient will tolerate least restrictive diet with

## 2024-01-24 NOTE — PROGRESS NOTES
NEUROSURGERY PROGRESS NOTE    LEYDI COLLADO   [id number]   1955 1/24/2024    Interval History: NAEO, therapeutic on heparin drip. CT head wo contrast stable   Hospital Day #2    Subjective: resting in bed eyes closed. No complaints    Objective:  /80   Pulse 59   Temp 97.4 °F (36.3 °C) (Temporal)   Resp 14   Ht 1.651 m (5' 5\")   Wt 88.8 kg (195 lb 12.3 oz)   SpO2 94%   BMI 32.58 kg/m²     Labs:  Recent Labs     01/22/24  0951 01/24/24  0429    140    106   CO2 23 23   BUN 17 17   CREATININE 0.9 1.0   GLUCOSE 137* 117*       Recent Labs     01/22/24  0951 01/22/24  1825 01/23/24  0606 01/24/24  0429   WBC 7.2 9.2 8.1 7.1   RBC 4.81 4.97 4.47 4.38   INR 0.96 0.97  --   --        Radiological Findings:  CT HEAD WO CONTRAST   Final Result      1.  No acute intracranial hemorrhage or mass effect.   2.  Recent infarct in the right parietal lobe.      Electronically signed by Yehuda Deleon MD      MRI BRAIN WO CONTRAST   Final Result      1. Patchy areas of acute infarction involving right parietal lobe cortex and subcortical white matter and to a lesser extent the superficial watershed territories at the right frontal lobe and occipital temporal lobe compatible with thromboembolic    disease.   2. No intracranial hemorrhage   3. Mild chronic small vessel ischemia and atrophy      Electronically signed by Hi Brody MD          Physical Exam:  Neurological:  Mental Status: Awake, alert, oriented x 4   Attention: Intact  Language: No aphasia or dysarthria noted  Sensation: mild sensory changes to LUE  Coordination: Intact     Cranial Nerves:  Cranial Nerves:  II: Visual acuity not tested, denies new visual changes / diplopia  III, IV, VI: PERRL, 3 mm bilaterally, EOMI, no nystagmus noted  V: Facial sensation intact bilaterally to touch  VII: Face symmetric  VIII: Hearing intact bilaterally to spoken voice  IX: Palate movement equal bilaterally  XI: Shoulder shrug equal

## 2024-01-24 NOTE — PROGRESS NOTES
Anti-Xa is now therapeutic x 2. Frequency changed to daily per protocol. Neuro status unchanged from previous.

## 2024-01-25 LAB
ANTI-XA UNFRAC HEPARIN: 0.36 IU/ML (ref 0.3–0.7)
CHOLEST SERPL-MCNC: 133 MG/DL (ref 0–199)
HDLC SERPL-MCNC: 41 MG/DL (ref 40–60)
LDLC SERPL CALC-MCNC: 71 MG/DL
TRIGL SERPL-MCNC: 103 MG/DL (ref 0–150)
VLDLC SERPL CALC-MCNC: 21 MG/DL

## 2024-01-25 PROCEDURE — 1200000000 HC SEMI PRIVATE

## 2024-01-25 PROCEDURE — 6360000002 HC RX W HCPCS

## 2024-01-25 PROCEDURE — 36415 COLL VENOUS BLD VENIPUNCTURE: CPT

## 2024-01-25 PROCEDURE — 99231 SBSQ HOSP IP/OBS SF/LOW 25: CPT | Performed by: NURSE PRACTITIONER

## 2024-01-25 PROCEDURE — 6370000000 HC RX 637 (ALT 250 FOR IP): Performed by: INTERNAL MEDICINE

## 2024-01-25 PROCEDURE — 85520 HEPARIN ASSAY: CPT

## 2024-01-25 PROCEDURE — 99291 CRITICAL CARE FIRST HOUR: CPT

## 2024-01-25 RX ADMIN — HEPARIN SODIUM 14 UNITS/KG/HR: 10000 INJECTION, SOLUTION INTRAVENOUS at 09:00

## 2024-01-25 RX ADMIN — ATORVASTATIN CALCIUM 80 MG: 80 TABLET, FILM COATED ORAL at 20:16

## 2024-01-25 ASSESSMENT — PAIN SCALES - GENERAL: PAINLEVEL_OUTOF10: 0

## 2024-01-25 NOTE — PROGRESS NOTES
matter small vessel ischemic disease. However, there is loss of gray-white matter differentiation within the right parietal lobe concerning for an acute infarct. ORBITS: The orbits are unremarkable. SINUSES: There is partial opacification of the left mastoid air cells. SOFT TISSUES/SKULL:  The calvarium is intact.     1. Loss of gray-white matter differentiation within the right parietal lobe concerning for an acute infarct. Findings were discussed with TERRY PERKINS at 10:24 am on 1/22/2024.       CBC:   Recent Labs     01/22/24  1825 01/23/24  0606 01/24/24  0429   WBC 9.2 8.1 7.1   HGB 14.2 12.9 12.5    163 156     BMP:    Recent Labs     01/22/24  0951 01/24/24  0429    140   K 5.2* 4.2    106   CO2 23 23   BUN 17 17   CREATININE 0.9 1.0   GLUCOSE 137* 117*     Hepatic:   Recent Labs     01/22/24  0951   AST 27   ALT 14   BILITOT 0.6   ALKPHOS 76     Lipids:   Lab Results   Component Value Date/Time    CHOL 133 01/24/2024 04:29 AM    HDL 41 01/24/2024 04:29 AM    TRIG 103 01/24/2024 04:29 AM     Hemoglobin A1C:   Lab Results   Component Value Date/Time    LABA1C 5.9 01/23/2024 06:06 AM     TSH:   Lab Results   Component Value Date/Time    TSH 1.76 07/01/2015 04:41 PM     Troponin: No results found for: \"TROPONINT\"  Lactic Acid: No results for input(s): \"LACTA\" in the last 72 hours.  BNP: No results for input(s): \"PROBNP\" in the last 72 hours.  UA:  Lab Results   Component Value Date/Time    NITRU POSITIVE 04/04/2023 05:05 AM    COLORU Yellow 04/04/2023 05:05 AM    PHUR 5.5 04/04/2023 05:05 AM    WBCUA 18 04/04/2023 05:05 AM    RBCUA 1 04/04/2023 05:05 AM    MUCUS 1+ 01/05/2011 01:55 PM    BACTERIA 4+ 04/04/2023 05:05 AM    CLARITYU Clear 04/04/2023 05:05 AM    SPECGRAV 1.023 04/04/2023 05:05 AM    LEUKOCYTESUR SMALL 04/04/2023 05:05 AM    UROBILINOGEN 0.2 04/04/2023 05:05 AM    BILIRUBINUR Negative 04/04/2023 05:05 AM    BILIRUBINUR NEGATIVE 01/05/2011 01:55 PM    BLOODU Negative 04/04/2023  05:05 AM    GLUCOSEU Negative 04/04/2023 05:05 AM    GLUCOSEU NEGATIVE 01/05/2011 01:55 PM    KETUA Negative 04/04/2023 05:05 AM     Urine Cultures:   Lab Results   Component Value Date/Time    LABURIN >100,000 CFU/ml 04/04/2023 05:05 AM     Blood Cultures:   Lab Results   Component Value Date/Time    BC No Growth after 4 days of incubation. 04/01/2023 11:17 PM     No results found for: \"BLOODCULT2\"  Organism:   Lab Results   Component Value Date/Time    ORG Escherichia coli 04/04/2023 05:05 AM         Electronically signed by Jennifer Ro MD on 1/25/2024 at 8:38 AM

## 2024-01-25 NOTE — PROGRESS NOTES
NEUROSURGERY PROGRESS NOTE    LEYDI COLLADO   [id number]   1955 1/25/2024    Interval History:  [facility] Day #3    Subjective: slept better overnight after neuro checks were downgraded, wants to take a shower. No acute complaints today     Objective:  /63   Pulse 57   Temp 97.8 °F (36.6 °C) (Temporal)   Resp 20   Ht 1.651 m (5' 5\")   Wt 88.8 kg (195 lb 12.3 oz)   SpO2 97%   BMI 32.58 kg/m²     Labs:  Recent Labs     01/22/24  0951 01/24/24  0429    140    106   CO2 23 23   BUN 17 17   CREATININE 0.9 1.0   GLUCOSE 137* 117*       Recent Labs     01/22/24  0951 01/22/24  1825 01/23/24  0606 01/24/24  0429   WBC 7.2 9.2 8.1 7.1   RBC 4.81 4.97 4.47 4.38   INR 0.96 0.97  --   --        Radiological Findings:  CT HEAD WO CONTRAST   Final Result      1.  No acute intracranial hemorrhage or mass effect.   2.  Recent infarct in the right parietal lobe.      Electronically signed by Yehuda Deleon MD      MRI BRAIN WO CONTRAST   Final Result      1. Patchy areas of acute infarction involving right parietal lobe cortex and subcortical white matter and to a lesser extent the superficial watershed territories at the right frontal lobe and occipital temporal lobe compatible with thromboembolic    disease.   2. No intracranial hemorrhage   3. Mild chronic small vessel ischemia and atrophy      Electronically signed by Hi Brody MD          Physical Exam:  Neurological:  Mental Status: Awake, alert, oriented x 4   Attention: Intact  Language: No aphasia or dysarthria noted  Sensation: intact to light touch throughout   Coordination: Intact     Cranial Nerves:  Cranial Nerves:  II: Visual acuity not tested, denies new visual changes / diplopia  III, IV, VI: PERRL, 3 mm bilaterally, EOMI, no nystagmus noted  V: Facial sensation intact bilaterally to touch  VII: Face symmetric  VIII: Hearing intact bilaterally to spoken voice  IX: Palate movement equal bilaterally  XI: Shoulder shrug equal

## 2024-01-25 NOTE — PROGRESS NOTES
NEUROCRITICAL CARE PROGRESS NOTE       Patient Name: Rosalba Wilson YOB: 1955   Sex: Female Age: 68 yrs     CC / Reason for Consult: stroke    Changes over last 24 hours:   No acute changes over night   Anti Xa 0.36 this AM  NIH 0     ROS: + Left arm weakness    ASSESSMENT & RECOMMENDATIONS   Assessment:  Angelia Wilson is a 67 yo female who presented for resolved LUE weakness and decreased sensation. Patient started on heparin infusion at OSH for thrombus seen in R cervical internal carotid artery with 90% stenosis. Stroke likely due to R ICA stenosis/thrombus     Plan:  -MRI reviewed  -Lipid panel - LDL 71  -continue high-intensity statin   -hold ASA for now as she is on heparin gtt  -SCDs for DVT prophylaxis  -low dose, no bolus neuro heparin gtt for thrombus-repeat   CTA head/neck in 5 days per neurosurgery - Saturday 1/27(ordered)     Consults / Nursing Care  -HOB elevated to help prevent aspiration  -Q4H Neurologic Exams & Vitals  -NIHSS per guidelines   -Telemetry   -PT/OT: eval and treat  -PMR consult if rehab recommended   -ST: eval and treat and dysphagia screen  -Nursing bedside swallow prior to any PO intake   -Blood Pressure Management              -SBP goal <160     Follow up / Discharge Recommendations:  -Stroke Education at Discharge  -Follow up w/ Neurology in 3 months     Discussed plan of care with DEYANIRA Gan - CNP   Neurocritical Care   1/25/2024 12:58 PM  [facility]: [facility] Neurocritical [facility]  HISTORY   Interval History:   MRI completed - showed patchy acute infarct in the R parietal lobe and watershed infarcts in the R frontal, occipital, and temporal lobes. No exam changes. Heparin infusion running.  CT head - no hemorrhage  Improvement in Left arm weakness    PMH Past Medical History:   Diagnosis Date    IFG (impaired fasting glucose)     Shoulder pain, right     Symptomatic stenosis of right carotid artery 01/22/2024    Varicose vein of leg

## 2024-01-25 NOTE — PLAN OF CARE
Problem: Discharge Planning  Goal: Discharge to home or other facility with appropriate resources  Outcome: Progressing     Problem: Chronic Conditions and Co-morbidities  Goal: Patient's chronic conditions and co-morbidity symptoms are monitored and maintained or improved  Outcome: Progressing     Problem: Neurosensory - Adult  Goal: Achieves stable or improved neurological status  Outcome: Progressing     Problem: Neurosensory - Adult  Goal: Achieves maximal functionality and self care  Outcome: Progressing     Problem: ABCDS Injury Assessment  Goal: Absence of physical injury  Outcome: Progressing     Problem: Safety - Adult  Goal: Free from fall injury  Outcome: Progressing

## 2024-01-25 NOTE — PROGRESS NOTES
No acute changes in neuro exam, no deificits noted. Pt remains A&OX4. Current VSS.     Educated pt on use of call light for assistance with ambulating. Pt has gotten out of bed without calling for help (she unhooks her monitors herself). Pt's bed is in the lowest position, wheels locked, bed alarm on,  socks applied, and call light in reach.

## 2024-01-25 NOTE — NURSE NAVIGATOR
R parietal acute stroke; CTA head/neck shows R cervical ICA stenosis with intraluminal thrombus.      Patient's chart reviewed for Stroke Core Measures and additional needs:               [x]   VTE prophylaxis - Heparin gtt, see eMAR               [x]   Antithrombotic (if applicable) - Hold ASA per NCC; Heparin gtt, see eMAR               [x]   Swallow screen prior to PO intake               [x]   Lipids / A1C ordered or resulted - AIC 5.9 (1/23/24); LDL 71 (1/24/24)               [x]   Therapy ordered              [x]   Care plan and Education template     - Patient therapeutic on Heparin gtt  - CT head stable with no acute intracranial hemorrhage or mass effect (1/23/24)   - Plan for CTA head/neck on Saturday (1/27/24) per neurosurgery   - Now on q 4 hour neuro checks per NCC and NSx     Verified educational Stroke booklet in room for patient and/or family to review. Discussed personal risk factors for Stroke/TIA with patient and daughter at bedside, and ways to reduce the risk for a recurrent stroke. Patients personal risk factors specific to stroke/TIA include: HTN, HLD, Overweight (BMI 31.66).     Advised pt. that you can reduce your risk for stroke/TIA by modifying/controlling the risk factors that you have. Pt.advised to take the medications as prescribed, which will be detailed in the discharge instructions, and to not stop taking them without consulting their physician. In addition, pt. advised to maintain a healthy diet, exercise regularly and to not smoke.    Educational information specific to stroke, signs & symptoms of stroke, and secondary stroke prevention provided in English and Bosnian format - patient and daughter at bedside verbalized understanding and agreeable.     [facility] [employer]'s Stroke treatment and prevention, Managing your recovery  notebook  provided and/or reviewed  with patient/family. The notebook includes, but not limited to, sections addressing warning signs & symptoms of a stroke,  which are: sudden numbness or weakness especially on one side of the body, sudden confusion, difficulty speaking or understanding, sudden changes in vision, sudden dizziness or loss of balance/ coordination, sudden severe headache, syncope and seizure. The need to call EMS (911) immediately if signs & symptoms occur is emphasized. The notebook also provides education on Stroke community resources and stroke advocacy.    The need for follow-up after discharge was highlighted with patient/family with them being able to repeat understanding of the importance of this.    Navigator to continue to follow patient while admitted, to assist with follow up and discharge planning as needed.     Nurse eSignature: Electronically signed by Tatiana Pina RN on 1/25/24 at 11:30 AM EST - Neuroscience Navigator

## 2024-01-26 LAB
ANTI-XA UNFRAC HEPARIN: 0.38 IU/ML (ref 0.3–0.7)
DEPRECATED RDW RBC AUTO: 16.4 % (ref 12.4–15.4)
HCT VFR BLD AUTO: 40.3 % (ref 36–48)
HGB BLD-MCNC: 13.2 G/DL (ref 12–16)
MCH RBC QN AUTO: 28.6 PG (ref 26–34)
MCHC RBC AUTO-ENTMCNC: 32.8 G/DL (ref 31–36)
MCV RBC AUTO: 87.3 FL (ref 80–100)
PLATELET # BLD AUTO: 170 K/UL (ref 135–450)
PMV BLD AUTO: 10 FL (ref 5–10.5)
RBC # BLD AUTO: 4.62 M/UL (ref 4–5.2)
WBC # BLD AUTO: 8.4 K/UL (ref 4–11)

## 2024-01-26 PROCEDURE — 6370000000 HC RX 637 (ALT 250 FOR IP): Performed by: INTERNAL MEDICINE

## 2024-01-26 PROCEDURE — 85520 HEPARIN ASSAY: CPT

## 2024-01-26 PROCEDURE — 99232 SBSQ HOSP IP/OBS MODERATE 35: CPT

## 2024-01-26 PROCEDURE — 36415 COLL VENOUS BLD VENIPUNCTURE: CPT

## 2024-01-26 PROCEDURE — 99231 SBSQ HOSP IP/OBS SF/LOW 25: CPT | Performed by: NURSE PRACTITIONER

## 2024-01-26 PROCEDURE — 2580000003 HC RX 258: Performed by: INTERNAL MEDICINE

## 2024-01-26 PROCEDURE — 85027 COMPLETE CBC AUTOMATED: CPT

## 2024-01-26 PROCEDURE — 1200000000 HC SEMI PRIVATE

## 2024-01-26 PROCEDURE — 6360000002 HC RX W HCPCS

## 2024-01-26 RX ADMIN — HEPARIN SODIUM 14 UNITS/KG/HR: 10000 INJECTION, SOLUTION INTRAVENOUS at 09:14

## 2024-01-26 RX ADMIN — ATORVASTATIN CALCIUM 80 MG: 80 TABLET, FILM COATED ORAL at 19:31

## 2024-01-26 RX ADMIN — SODIUM CHLORIDE, PRESERVATIVE FREE 10 ML: 5 INJECTION INTRAVENOUS at 08:15

## 2024-01-26 ASSESSMENT — PAIN SCALES - GENERAL
PAINLEVEL_OUTOF10: 0

## 2024-01-26 NOTE — PROGRESS NOTES
present at this location. 2. Small acute infarct within the right frontal parietal lobe. 3. No intracranial large vessel occlusion or aneurysm.     XR CHEST PORTABLE    Result Date: 1/22/2024  EXAMINATION: ONE XRAY VIEW OF THE CHEST 1/22/2024 9:54 am COMPARISON: None. HISTORY: ORDERING SYSTEM PROVIDED HISTORY: stroke symptoms TECHNOLOGIST PROVIDED HISTORY: Reason for exam:->stroke symptoms Reason for Exam: stroke symptoms FINDINGS: Bibasilar atelectasis and opacification of the retrocardiac left lung, also favored to represent atelectasis.  Correlate clinically.  The lungs otherwise clear.  No pleural effusion or pneumothorax.  The cardiac silhouette appears enlarged, may be projectional in nature.  The mediastinal contours are unremarkable.     1. Bibasilar atelectasis and opacification of the retrocardiac left lung, also favored to represent atelectasis.  Correlate clinically.  The lungs otherwise clear. 2. The cardiac silhouette appears enlarged, may be projectional in nature.     CT HEAD WO CONTRAST    Result Date: 1/22/2024  EXAMINATION: CT OF THE HEAD WITHOUT CONTRAST  1/22/2024 9:52 am TECHNIQUE: CT of the head was performed without the administration of intravenous contrast. Automated exposure control, iterative reconstruction, and/or weight based adjustment of the mA/kV was utilized to reduce the radiation dose to as low as reasonably achievable. COMPARISON: 03/29/2013 HISTORY: ORDERING SYSTEM PROVIDED HISTORY: Stroke Symptoms- left arm weakness TECHNOLOGIST PROVIDED HISTORY: Has a \"code stroke\" or \"stroke alert\" been called?->Yes Reason for exam:->Stroke Symptoms- left arm weakness Decision Support Exception - unselect if not a suspected or confirmed emergency medical condition->Emergency Medical Condition (MA) Reason for Exam: Stroke Symptoms- left arm weakness Relevant Medical/Surgical History: none FINDINGS: BRAIN/VENTRICLES: Motion artifact degrades image quality.  There is no acute intracerebral  NEGATIVE 01/05/2011 01:55 PM    BLOODU Negative 04/04/2023 05:05 AM    GLUCOSEU Negative 04/04/2023 05:05 AM    GLUCOSEU NEGATIVE 01/05/2011 01:55 PM    KETUA Negative 04/04/2023 05:05 AM     Urine Cultures:   Lab Results   Component Value Date/Time    LABURIN >100,000 CFU/ml 04/04/2023 05:05 AM     Blood Cultures:   Lab Results   Component Value Date/Time    BC No Growth after 4 days of incubation. 04/01/2023 11:17 PM     No results found for: \"BLOODCULT2\"  Organism:   Lab Results   Component Value Date/Time    ORG Escherichia coli 04/04/2023 05:05 AM         Electronically signed by Kirsten Palacios MD on 1/26/2024 at 10:22 AM

## 2024-01-26 NOTE — PLAN OF CARE
Problem: Discharge Planning  Goal: Discharge to home or other facility with appropriate resources  1/26/2024 1614 by Gloria Howell, RN  Outcome: Progressing  Pt involved in discharge planning. Barriers to discharge discussed with pt. Discharge learning needs identified. Discussed with pt any additional needed resources and transportation plans.       Problem: Safety - Adult  Goal: Free from fall injury  1/26/2024 1614 by Gloria Howell, RN  Outcome: Progressing  All fall precautions in place. Bed locked and in lowest position with alarm on. Overbed table and personal belonings within reach. Call light within reach and patient instructed to use call light for assistance. Non-skid socks on.      Problem: Pain  Goal: Verbalizes/displays adequate comfort level or baseline comfort level  1/26/2024 1614 by Gloria Howell, RN  Outcome: Progressing  Pt denies any pain throughout shift. Able to verbalize pain on 0-10 scale.

## 2024-01-26 NOTE — CARE COORDINATION
Case management is following for discharge planning. The chart was reviewed. Ms. Wilson remains in the ICU. Neuro and NSGY following. CTA head/neck 5 days from initiation of heparin drip to evaluate resolution of clot/status of stenosis  1/27. Therapy has worked with the pt and recommended home with supervision. She lives with supportive family.    Manju Guerrero RN  [phone]

## 2024-01-26 NOTE — PROGRESS NOTES
Patient admitted to room 5511. Report received from RN Nanette in ICU. VSS on room air. Patient oriented to room and call light. Rights and responsibilities provided to patient, and welcome packet provided to patient. 4 eyes skin assessment completed with 2nd RN.

## 2024-01-26 NOTE — PROGRESS NOTES
Right  Left      Right  Left    Deltoid  5 5   Hip Flex  5 5   Biceps  5 5   Knee Extensors  5 5   Triceps  5 5   Knee Flexors  5 5   Wrist Ext  5 5   Ankle Dorsiflex.  5 5   Wrist Flex  5 5   Ankle Plantarflex.  5 5   Handgrip  5 5   Ext Jony Longus  5 5   Thumb Ext  5 5                Assessment:  69 yo female who presents w/ PMH CKD, HTN who presents with wake up left arm weakness and 2 day history of left sensory changes.     CTA w/ 90% cervical ICA stenosis and intraluminal thrombus. CT head wo contrast concerning for acute right parietal stroke.      MRI brain reveals acute infarction in right parietal lobe cortex and superficial watershed territories at the right frontal lobe and occipital temporal lobe     Plan:  No emergent neurosurgical intervention indicated  Continue neuro protocol heparin drip  -CTA head/neck 5 days from initiation of heparin drip to evaluate resolution of clot/status of stenosis (tmw AM, 1/27)   -CT head wo contrast stable after therapeutic on heparin   q 4 hour neuro checks    NCC following   Activity/diet per primary team  Will follow, please call with questions     DISPO- Dispo timing to be determined by primary team once patient is medically stable for discharge.     DEYANIRA Babcock-CNP  Neurosurgery Nurse Practitioner  [phone]    Patient was discussed with Dr. Soto who agrees with above assessment and plan.     Electronically signed by: DEYANIRA Wise NP, 1/26/2024 9:07 AM

## 2024-01-26 NOTE — PROGRESS NOTES
NEUROCRITICAL CARE PROGRESS NOTE       Patient Name: Rosalba Wilson YOB: 1955   Sex: Female Age: 68 yrs     CC / Reason for Consult: stroke    Changes over last 24 hours:   No acute changes over night   Anti Xa 0.36 this AM    ROS: + Left arm weakness    ASSESSMENT & RECOMMENDATIONS   Assessment:  Angelia Wilson is a 69 yo female who presented for resolved LUE weakness and decreased sensation. Patient started on heparin infusion at OSH for thrombus seen in R cervical internal carotid artery with 90% stenosis. Stroke likely due to R ICA stenosis/thrombus     Plan:  -MRI reviewed  -Lipid panel - LDL 71  -continue high-intensity statin   -hold ASA for now as she is on heparin gtt  -SCDs  -low dose, no bolus neuro heparin gtt for thrombus-repeat   - CTA head/neck in 5 days - Saturday 1/27(ordered) further plan post CTA per Neurovascular     Consults / Nursing Care  -HOB elevated to help prevent aspiration  -Q4H Neurologic Exams & Vitals  -NIHSS per guidelines   -Telemetry   -PT/OT/ST   - BP running 90s - 120s and she has been tolerating well     Follow up / Discharge Recommendations:  -Stroke Education at Discharge  -Follow up w/ Neurology in 3 months     Discussed plan of care with Dr. Hernández, patient, daughter at bedside    DEYANIRA Garcia - CNP   Neurocritical Care   1/26/2024 11:23 AM  [facility]: [facility]  HISTORY   Interval History:   MRI completed - showed patchy acute infarct in the R parietal lobe and watershed infarcts in the R frontal, occipital, and temporal lobes. No exam changes. Heparin infusion running.  CT head - no hemorrhage  Improvement in Left arm weakness    PMH Past Medical History:   Diagnosis Date    IFG (impaired fasting glucose)     Shoulder pain, right     Symptomatic stenosis of right carotid artery 01/22/2024    Varicose vein of leg 07/23/2015      Allergies No Known Allergies   Diet ADULT DIET; Regular   Isolation No active isolations

## 2024-01-26 NOTE — PROGRESS NOTES
4 Eyes Skin Assessment     NAME:  Rosalba Wilson  YOB: 1955  MEDICAL RECORD NUMBER:  [id number]    The patient is being assessed for  Admission    I agree that at least one RN has performed a thorough Head to Toe Skin Assessment on the patient. ALL assessment sites listed below have been assessed.      Areas assessed by both nurses:    Head, Face, Ears, Shoulders, Back, Chest, Arms, Elbows, Hands, Sacrum. Buttock, Coccyx, Ischium, Legs. Feet and Heels, Under Medical Devices , and Other      Blanchable redness to coccyx  Ecchymosis to Right upper shoulder   Dry skin to bilateral heels          Does the Patient have a Wound? No noted wound(s)       Jose Prevention initiated by RN: Yes  Wound Care Orders initiated by RN: No    Pressure Injury (Stage 3,4, Unstageable, DTI, NWPT, and Complex wounds) if present, place Wound referral order by RN under : Yes    New Ostomies, if present place, Ostomy referral order under : No     Nurse 1 eSignature: Electronically signed by Gloria Howell RN on 1/26/24 at 1:23 PM EST    **SHARE this note so that the co-signing nurse can place an eSignature**    Nurse 2 eSignature: Electronically signed by Sangeeta Song RN on 1/26/24 at 4:19 PM EST

## 2024-01-26 NOTE — PROGRESS NOTES
Current NIHSS 0    Nursing Core Measures for Stroke:   [x]   Education template documentation (STROKE/TIA). Please select only risk factors that are applicable to patient when selecting risk factors.  [x]   Care Plan template documentation (Physiologic Instability - Neurosensory). Selecting this will add care plan rows to the flowsheet under the Neuro section of Head to Toe.  [x]   Verified Swallow Screen completed prior to PO intake of food, drink, medications>          Please verify correct medication route prior to administration for intubated patients, patients who can not swallow or have alternative routes of intake (NG, OG, LA), etc  [x]   VTE Prophylaxis: Heparin ordered/addressed; SCDs: N/A Heparin           (As a reminder, ASA, Plavix, and TPA/TNK are not VTE prophylaxis.)    Reviewed the Following Education with Patient and/or Family:   - Personalized risk factors for patient, along with changes, modifications that will help prevent stroke.  - Signs and Symptoms of Stroke: (Facial droop, weakness/numbness especially on one side, speech difficulty, sudden confusion, sudden loss of vision, sudden severe headache, sudden loss of balance or having difficulty walking, syncope, or seizure)  - How to activate EMS (911)   - Importance of Follow Up Appointments at Discharge   - Importance of Compliance with Medications Prescribed at Discharge  - Available community resources and stroke advocacy groups if needed    Patient and/or family member: not available / not appropriate for education at this time.     Stroke Education booklet given to patient/family (or verified, if given already), which reviews above information. yes         Electronically signed by Emma Trammell RN on 1/26/2024 at 5:31 AM

## 2024-01-26 NOTE — PLAN OF CARE
Problem: Discharge Planning  Goal: Discharge to home or other facility with appropriate resources  Outcome: Progressing  Flowsheets (Taken 1/25/2024 2000)  Discharge to home or other facility with appropriate resources: Identify barriers to discharge with patient and caregiver     Problem: Chronic Conditions and Co-morbidities  Goal: Patient's chronic conditions and co-morbidity symptoms are monitored and maintained or improved  Outcome: Progressing  Flowsheets (Taken 1/25/2024 2000)  Care Plan - Patient's Chronic Conditions and Co-Morbidity Symptoms are Monitored and Maintained or Improved: Monitor and assess patient's chronic conditions and comorbid symptoms for stability, deterioration, or improvement     Problem: Neurosensory - Adult  Goal: Achieves stable or improved neurological status  Outcome: Progressing  Flowsheets (Taken 1/25/2024 2000)  Achieves stable or improved neurological status: Assess for and report changes in neurological status     Problem: Neurosensory - Adult  Goal: Achieves maximal functionality and self care  Outcome: Progressing  Flowsheets (Taken 1/25/2024 2000)  Achieves maximal functionality and self care: Monitor swallowing and airway patency with patient fatigue and changes in neurological status     Problem: ABCDS Injury Assessment  Goal: Absence of physical injury  Outcome: Progressing  Flowsheets (Taken 1/26/2024 0012)  Absence of Physical Injury: Implement safety measures based on patient assessment     Problem: Safety - Adult  Goal: Free from fall injury  Outcome: Progressing  Flowsheets (Taken 1/26/2024 0012)  Free From Fall Injury: Instruct family/caregiver on patient safety     Problem: Pain  Goal: Verbalizes/displays adequate comfort level or baseline comfort level  Outcome: Progressing

## 2024-01-26 NOTE — PLAN OF CARE
Problem: Discharge Planning  Goal: Discharge to home or other facility with appropriate resources  1/26/2024 1022 by Nanette Vásquez RN  Outcome: Progressing  Flowsheets (Taken 1/26/2024 0800)  Discharge to home or other facility with appropriate resources: Identify barriers to discharge with patient and caregiver  1/26/2024 0015 by Emma Trammell RN  Outcome: Progressing  Flowsheets (Taken 1/25/2024 2000)  Discharge to home or other facility with appropriate resources: Identify barriers to discharge with patient and caregiver     Problem: Chronic Conditions and Co-morbidities  Goal: Patient's chronic conditions and co-morbidity symptoms are monitored and maintained or improved  1/26/2024 1022 by Nanette Vásquez RN  Outcome: Progressing  Flowsheets (Taken 1/26/2024 0800)  Care Plan - Patient's Chronic Conditions and Co-Morbidity Symptoms are Monitored and Maintained or Improved: Monitor and assess patient's chronic conditions and comorbid symptoms for stability, deterioration, or improvement  1/26/2024 0015 by Emma Trammell RN  Outcome: Progressing  Flowsheets (Taken 1/25/2024 2000)  Care Plan - Patient's Chronic Conditions and Co-Morbidity Symptoms are Monitored and Maintained or Improved: Monitor and assess patient's chronic conditions and comorbid symptoms for stability, deterioration, or improvement     Problem: Neurosensory - Adult  Goal: Achieves stable or improved neurological status  1/26/2024 1022 by Nanette Vásquez RN  Outcome: Progressing  Flowsheets (Taken 1/26/2024 0800)  Achieves stable or improved neurological status: Assess for and report changes in neurological status  1/26/2024 0015 by Emma Trammell RN  Outcome: Progressing  Flowsheets (Taken 1/25/2024 2000)  Achieves stable or improved neurological status: Assess for and report changes in neurological status  Goal: Achieves maximal functionality and self care  1/26/2024 1022 by Nanette Vásquez RN  Outcome: Progressing  Flowsheets (Taken

## 2024-01-26 NOTE — PROGRESS NOTES
Pt is alert and oriented x4. VSS on RA. Ambulating SBA. Voiding adequately via BRP. Tolerating PO meals and fluids appropriately. Denies pain. Heparin gtt infusing at 14 units/kg/hr. NIH 0. Standard safety precautions in place at this time. Repeat CTA head/neck planned for tomorrow. Plan of care to continue. Standard fall and safety precautions in place at this time. Bed locked and in lowest position with call light, bedside table, and belongings within reach.

## 2024-01-27 ENCOUNTER — APPOINTMENT (OUTPATIENT)
Dept: CT IMAGING | Age: 69
End: 2024-01-27
Attending: SURGERY
Payer: MEDICARE

## 2024-01-27 LAB
ANTI-XA UNFRAC HEPARIN: 0.41 IU/ML (ref 0.3–0.7)
CHOLEST SERPL-MCNC: 139 MG/DL (ref 0–199)
HDLC SERPL-MCNC: 41 MG/DL (ref 40–60)
LDLC SERPL CALC-MCNC: 77 MG/DL
TRIGL SERPL-MCNC: 106 MG/DL (ref 0–150)
VLDLC SERPL CALC-MCNC: 21 MG/DL

## 2024-01-27 PROCEDURE — 6370000000 HC RX 637 (ALT 250 FOR IP): Performed by: STUDENT IN AN ORGANIZED HEALTH CARE EDUCATION/TRAINING PROGRAM

## 2024-01-27 PROCEDURE — 36415 COLL VENOUS BLD VENIPUNCTURE: CPT

## 2024-01-27 PROCEDURE — 6360000004 HC RX CONTRAST MEDICATION

## 2024-01-27 PROCEDURE — 2580000003 HC RX 258: Performed by: INTERNAL MEDICINE

## 2024-01-27 PROCEDURE — 85520 HEPARIN ASSAY: CPT

## 2024-01-27 PROCEDURE — 6360000002 HC RX W HCPCS

## 2024-01-27 PROCEDURE — 70498 CT ANGIOGRAPHY NECK: CPT

## 2024-01-27 PROCEDURE — 6370000000 HC RX 637 (ALT 250 FOR IP): Performed by: INTERNAL MEDICINE

## 2024-01-27 PROCEDURE — 2060000000 HC ICU INTERMEDIATE R&B

## 2024-01-27 RX ORDER — CLOPIDOGREL BISULFATE 75 MG/1
300 TABLET ORAL ONCE
Status: COMPLETED | OUTPATIENT
Start: 2024-01-27 | End: 2024-01-27

## 2024-01-27 RX ORDER — ASPIRIN 81 MG/1
81 TABLET, CHEWABLE ORAL DAILY
Status: DISCONTINUED | OUTPATIENT
Start: 2024-01-27 | End: 2024-02-01 | Stop reason: HOSPADM

## 2024-01-27 RX ORDER — CLOPIDOGREL BISULFATE 75 MG/1
75 TABLET ORAL DAILY
Status: DISCONTINUED | OUTPATIENT
Start: 2024-01-28 | End: 2024-02-01 | Stop reason: HOSPADM

## 2024-01-27 RX ADMIN — ATORVASTATIN CALCIUM 80 MG: 80 TABLET, FILM COATED ORAL at 21:35

## 2024-01-27 RX ADMIN — HEPARIN SODIUM 14 UNITS/KG/HR: 10000 INJECTION, SOLUTION INTRAVENOUS at 06:42

## 2024-01-27 RX ADMIN — CLOPIDOGREL BISULFATE 300 MG: 75 TABLET ORAL at 11:47

## 2024-01-27 RX ADMIN — IOPAMIDOL 75 ML: 755 INJECTION, SOLUTION INTRAVENOUS at 07:44

## 2024-01-27 RX ADMIN — ASPIRIN 81 MG 81 MG: 81 TABLET ORAL at 11:47

## 2024-01-27 RX ADMIN — SODIUM CHLORIDE, PRESERVATIVE FREE 10 ML: 5 INJECTION INTRAVENOUS at 09:00

## 2024-01-27 RX ADMIN — SODIUM CHLORIDE, PRESERVATIVE FREE 10 ML: 5 INJECTION INTRAVENOUS at 21:28

## 2024-01-27 ASSESSMENT — PAIN SCALES - GENERAL
PAINLEVEL_OUTOF10: 0

## 2024-01-27 NOTE — PLAN OF CARE
Problem: Neurosensory - Adult  Goal: Achieves stable or improved neurological status  Note: Alert oriented times 4 gait steady , void without difficulty , heparin drip dc ed and given Plavix  and Asprin , family at beds side , plan procedure , on the 30 th , son aware , pt also aware ,

## 2024-01-27 NOTE — PLAN OF CARE
Problem: Discharge Planning  Goal: Discharge to home or other facility with appropriate resources  Outcome: Progressing  Discharge to home or other facility with appropriate resources: Identify barriers to discharge with patient and caregiver     Problem: Chronic Conditions and Co-morbidities  Goal: Patient's chronic conditions and co-morbidity symptoms are monitored and maintained or improved  Outcome: Progressing  Care Plan - Patient's Chronic Conditions and Co-Morbidity Symptoms are Monitored and Maintained or Improved: Monitor and assess patient's chronic conditions and comorbid symptoms for stability, deterioration, or improvement    Problem: Neurosensory - Adult  Goal: Achieves stable or improved neurological status  Outcome: Progressing  Achieves stable or improved neurological status: Assess for and report changes in neurological status    Goal: Achieves maximal functionality and self care  Outcome: Progressing  Achieves maximal functionality and self care: Monitor swallowing and airway patency with patient fatigue and changes in neurological status     Problem: ABCDS Injury Assessment  Goal: Absence of physical injury  Outcome: Progressing  Absence of Physical Injury: Implement safety measures based on patient assessment     Problem: Safety - Adult  Goal: Free from fall injury  Outcome: Progressing  Free From Fall Injury: Instruct family/caregiver on patient safety     Problem: Pain  Goal: Verbalizes/displays adequate comfort level or baseline comfort level  Outcome: Progressing  Verbalizes/displays adequate comfort level or baseline comfort level: Encourage patient to monitor pain and request assistance

## 2024-01-27 NOTE — PROGRESS NOTES
NEUROSURGERY PROGRESS NOTE    LEYDI COLLADO   [id number]   1955 1/27/2024    Interval History:  [facility] Day #5    Subjective: resting in bed, awake. No complaints . Son is present.     Objective:  /78   Pulse 72   Temp 98 °F (36.7 °C)   Resp 16   Ht 1.651 m (5' 5\")   Wt 88.8 kg (195 lb 12.3 oz)   SpO2 94%   BMI 32.58 kg/m²     Labs:  No results for input(s): \"NA\", \"POTASSIUM\", \"CL\", \"CO2\", \"BUN\", \"CREATININE\", \"GLUCOSE\", \"CA\", \"ALB\" in the last 72 hours.    Invalid input(s): \"PHOSHORUS\", \"MAGNESIUM\"    Recent Labs     01/26/24  0619   WBC 8.4   RBC 4.62       Radiological Findings:  CTA HEAD NECK W CONTRAST   Final Result      High-grade, hemodynamically significant short segment stenosis of the origin of   the right internal carotid artery secondary to calcified and noncalcified   atherosclerotic plaque.      Resolution of a small amount of associated thrombus on prior study since prior   study from January 22, 2024.      Minimal atherosclerotic disease of the left carotid bifurcation without   significant luminal narrowing.         PROCEDURE: CT ANGIOGRAPHY HEAD WITH/WITHOUT CONTRAST      INDICATION: eval R ICA stenosis/thrombus      COMPARISON: January 22, 2024      TECHNIQUE: Axial CT imaging obtained through the head prior to and following   administration of IV contrast. Axial images, multiplanar reformatted images, and   maximum intensity projection images were reviewed for CT angiographic technique.   IV contrast: 75 mL Isovue 370.      FINDINGS:      ANTERIOR CIRCULATION: The intracranial internal carotid arteries, anterior   cerebral arteries, and middle cerebral arteries demonstrate no occlusion or   stenosis. Calcified atherosclerotic plaque of the distal internal carotid   arteries bilaterally without hemodynamically significant luminal narrowing. No   evidence for aneurysm or arteriovenous malformation.      POSTERIOR CIRCULATION: The bilateral vertebral arteries,

## 2024-01-28 LAB
ANION GAP SERPL CALCULATED.3IONS-SCNC: 11 MMOL/L (ref 3–16)
BUN SERPL-MCNC: 17 MG/DL (ref 7–20)
CALCIUM SERPL-MCNC: 9.1 MG/DL (ref 8.3–10.6)
CHLORIDE SERPL-SCNC: 106 MMOL/L (ref 99–110)
CLOSURE TME BLD-IMP: ABNORMAL
CLOSURE TME COLL+ADP BLD: 69 SEC (ref 56–110)
CLOSURE TME COLL+EPINEP BLD: 267 SEC (ref 86–194)
CO2 SERPL-SCNC: 22 MMOL/L (ref 21–32)
CREAT SERPL-MCNC: 0.9 MG/DL (ref 0.6–1.2)
DEPRECATED RDW RBC AUTO: 16.6 % (ref 12.4–15.4)
GFR SERPLBLD CREATININE-BSD FMLA CKD-EPI: >60 ML/MIN/{1.73_M2}
GLUCOSE SERPL-MCNC: 101 MG/DL (ref 70–99)
HCT VFR BLD AUTO: 41.9 % (ref 36–48)
HGB BLD-MCNC: 13.7 G/DL (ref 12–16)
MCH RBC QN AUTO: 28.6 PG (ref 26–34)
MCHC RBC AUTO-ENTMCNC: 32.6 G/DL (ref 31–36)
MCV RBC AUTO: 87.8 FL (ref 80–100)
PLATELET # BLD AUTO: 186 K/UL (ref 135–450)
PMV BLD AUTO: 9.3 FL (ref 5–10.5)
POTASSIUM SERPL-SCNC: 4.5 MMOL/L (ref 3.5–5.1)
RBC # BLD AUTO: 4.77 M/UL (ref 4–5.2)
SODIUM SERPL-SCNC: 139 MMOL/L (ref 136–145)
WBC # BLD AUTO: 9.1 K/UL (ref 4–11)

## 2024-01-28 PROCEDURE — 80048 BASIC METABOLIC PNL TOTAL CA: CPT

## 2024-01-28 PROCEDURE — 6370000000 HC RX 637 (ALT 250 FOR IP): Performed by: INTERNAL MEDICINE

## 2024-01-28 PROCEDURE — 99232 SBSQ HOSP IP/OBS MODERATE 35: CPT | Performed by: NURSE PRACTITIONER

## 2024-01-28 PROCEDURE — 2580000003 HC RX 258: Performed by: INTERNAL MEDICINE

## 2024-01-28 PROCEDURE — 85576 BLOOD PLATELET AGGREGATION: CPT

## 2024-01-28 PROCEDURE — 2060000000 HC ICU INTERMEDIATE R&B

## 2024-01-28 PROCEDURE — 36415 COLL VENOUS BLD VENIPUNCTURE: CPT

## 2024-01-28 PROCEDURE — 6370000000 HC RX 637 (ALT 250 FOR IP): Performed by: STUDENT IN AN ORGANIZED HEALTH CARE EDUCATION/TRAINING PROGRAM

## 2024-01-28 PROCEDURE — 85027 COMPLETE CBC AUTOMATED: CPT

## 2024-01-28 RX ADMIN — SODIUM CHLORIDE, PRESERVATIVE FREE 10 ML: 5 INJECTION INTRAVENOUS at 20:19

## 2024-01-28 RX ADMIN — ASPIRIN 81 MG 81 MG: 81 TABLET ORAL at 08:46

## 2024-01-28 RX ADMIN — CLOPIDOGREL BISULFATE 75 MG: 75 TABLET ORAL at 08:46

## 2024-01-28 RX ADMIN — SODIUM CHLORIDE, PRESERVATIVE FREE 10 ML: 5 INJECTION INTRAVENOUS at 08:46

## 2024-01-28 RX ADMIN — ATORVASTATIN CALCIUM 80 MG: 80 TABLET, FILM COATED ORAL at 20:19

## 2024-01-28 ASSESSMENT — PAIN SCALES - GENERAL: PAINLEVEL_OUTOF10: 0

## 2024-01-28 NOTE — PROGRESS NOTES
NEUROLOGY PROGRESS NOTE       Patient Name: Rosalba Wilson YOB: 1955   Sex: Female Age: 68 yrs     CC / Reason for Consult: stroke    Changes over last 24 hours:   A1c 5.9%  LDL 71 mg/dL    ROS: No aphasia; no weakness    ASSESSMENT & RECOMMENDATIONS   Assessment:  Rosalba Wilson is a 67 y/o woman with left hemiparesis and sensory changes found to have high-grade right ICA stenosis and associated thrombus which resolved following heparin gtt. She was transitioned to DAPT 1/27 with plans for carotid revascularization with Dr. Balbuena 1/30. Exam stable today, Rosalba has no complaints.    Plan:  - Continue asa and Plavix  - Q4h neuro checks  - Tentative plan for right carotid angioplasty and stenting 1/30  - STAT CT-A and page neurology for any acute changes    Neurology will sign off, call with questions/concerns or change in exam. Neurosurgery NP team will follow tomorrow. Please re-consult neurocritical care post-operatively if further assistance is needed after stenting.    Beatriz Marcano, APRN - CNP   Neurology  1/28/2024 8:41 AM  [facility]: [facility] Neurology    HISTORY   Interval History: no interval changes    [facility] Past Medical History:   Diagnosis Date    IFG (impaired fasting glucose)     Shoulder pain, right     Symptomatic stenosis of right carotid artery 01/22/2024    Varicose vein of leg 07/23/2015      Allergies No Known Allergies   Diet ADULT DIET; Regular; Low Fat/Low Chol/High Fiber/2 gm Na   Isolation No active isolations     CURRENT SCHEDULED MEDICATIONS   Inpatient Medications     aspirin, 81 mg, Oral, Daily    [COMPLETED] clopidogrel, 300 mg, Oral, Once **FOLLOWED BY** clopidogrel, 75 mg, Oral, Daily    sodium chloride flush, 5-40 mL, IntraVENous, 2 times per day    atorvastatin, 80 mg, Oral, Nightly   Infusions    sodium chloride                LABS   Metabolic Panel Recent Labs     01/28/24  0521      K 4.5      CO2 22   BUN 17   CREATININE 0.9   GLUCOSE 101*

## 2024-01-28 NOTE — PROGRESS NOTES
No acute issues overnight. Patient A&O x4, all VSS on room air. Voiding adequately via standby assist to the bathroom. Denies pain. All fall precautions and safety measures are in place, patient utilizing call light appropriately. Will continue to monitor.

## 2024-01-28 NOTE — PROGRESS NOTES
V2.0    [facility] [place]      Name:  Rosalba Wilson /Age/Sex: 1955  (68 y.o. female)   MRN & CSN:  [id number] & [id number] Encounter Date/Time: 2024 8:04 AM EST   Location:  [facility]/5511-01 PCP: Georgia Martinez APRN - CNP     Attending:Kirsten Palacios MD       Hospital Day: 7    Subjective:     Chief Complaint: Left hand weakness    Rosalba Wilson is a 68 y.o. female of [place] origin, who presented with L arm weakness    For about 2 days she found some numbness and \"off sensation\" in her arm, patient was not able to lift her arm..  Denies headache.  No weakness of legs     Patient went to the ED, she was felt to be out of window for tPA.   She was transferred here for further evaluation.    Review of Systems:      Pertinent positives and negatives discussed in HPI    Objective:     Intake/Output Summary (Last 24 hours) at 2024 1352  Last data filed at 2024 0600  Gross per 24 hour   Intake 360 ml   Output --   Net 360 ml        Vitals:   Vitals:    24 2345 24 0357 24 0800 24 1200   BP: 112/78 111/73 101/70 129/72   Pulse: 80 88 79    Resp: 16 15 16    Temp: 98.4 °F (36.9 °C) 98.4 °F (36.9 °C) 97.9 °F (36.6 °C)    TempSrc: Oral Oral Oral    SpO2: 94% 95% 93%    Weight:       Height:             Physical Exam:      General: NAD  Eyes: EOMI  ENT: neck supple  Cardiovascular: Regular rate.  Respiratory: Clear to auscultation  Gastrointestinal: Soft, non tender  Genitourinary: no suprapubic tenderness  Musculoskeletal: No edema  Skin: warm, dry  Neuro: Alert.  Psych: Mood appropriate.     Medications:   Medications:    aspirin  81 mg Oral Daily    clopidogrel  75 mg Oral Daily    sodium chloride flush  5-40 mL IntraVENous 2 times per day    atorvastatin  80 mg Oral Nightly      Infusions:    sodium chloride       PRN Meds: labetalol, 10 mg, Q4H PRN  sodium chloride flush, 5-40 mL, PRN  sodium chloride, , PRN  ondansetron, 4 mg, Q8H PRN   Or  ondansetron, 4 mg,  Q6H PRN  polyethylene glycol, 17 g, Daily PRN        Labs and Imaging   MRI BRAIN WO CONTRAST    Result Date: 1/23/2024  EXAM: MRI BRAIN WO  EXTRACRANIAL SOFT TISSUES: Normal     1. Patchy areas of acute infarction involving right parietal lobe cortex and subcortical white matter and to a lesser extent the superficial watershed territories at the right frontal lobe and occipital temporal lobe compatible with thromboembolic disease. 2. No intracranial hemorrhage 3. Mild chronic small vessel ischemia and atrophy Electronically signed by Hi Brody MD    CTA HEAD NECK W CONTRAST    Result Date: 1/22/2024   1. Severe atherosclerotic disease of the proximal right cervical internal carotid artery resulting in 90% diameter stenosis.  Small pedunculated intraluminal thrombus is present at this location. 2. Small acute infarct within the right frontal parietal lobe. 3. No intracranial large vessel occlusion or aneurysm.     XR CHEST PORTABLE  Result Date: 1/22/2024  1. Bibasilar atelectasis and opacification of the retrocardiac left lung, also favored to represent atelectasis.  Correlate clinically.  The lungs otherwise clear. 2. The cardiac silhouette appears enlarged, may be projectional in nature.     CT HEAD WO CONTRAST    Result Date: 1/22/2024  1. Loss of gray-white matter differentiation within the right parietal lobe concerning for an acute infarct. Findings were discussed with TERRY PERKINS at 10:24 am on 1/22/2024.       CBC:   Recent Labs     01/26/24  0619 01/28/24  0521   WBC 8.4 9.1   HGB 13.2 13.7    186       BMP:    Recent Labs     01/28/24  0521      K 4.5      CO2 22   BUN 17   CREATININE 0.9   GLUCOSE 101*       Hepatic:   No results for input(s): \"AST\", \"ALT\", \"ALB\", \"BILITOT\", \"ALKPHOS\" in the last 72 hours.    Lipids:   Lab Results   Component Value Date/Time    CHOL 133 01/24/2024 04:29 AM    HDL 41 01/24/2024 04:29 AM    TRIG 103 01/24/2024 04:29 AM     Hemoglobin A1C:   Lab

## 2024-01-28 NOTE — PROGRESS NOTES
V2.0    [facility] Progress Note      Name:  Rosalba Wilson /Age/Sex: 1955  (68 y.o. female)   MRN & CSN:  [id number] & [id number] Encounter Date/Time: 2024 8:04 AM EST   Location:  [facility]/5511-01 PCP: Georgia Martinez APRN - CNP     Attending:Kirsten Palacios MD       Hospital Day: 6    Subjective:     Chief Complaint: Left hand weakness    Rosalba Wilson is a 68 y.o. female of [place]ri[place] origin, who presented with L arm weakness    For about 2 days she found some numbness and \"off sensation\" in her arm, patient was not able to lift her arm..  Denies headache.  No weakness of legs     Patient went to the ED, she was felt to be out of window for tPA.   She was transferred here for further evaluation.    Review of Systems:      Pertinent positives and negatives discussed in HPI    Objective:     Intake/Output Summary (Last 24 hours) at 2024  Last data filed at 2024 0139  Gross per 24 hour   Intake 1549.27 ml   Output --   Net 1549.27 ml        Vitals:   Vitals:    24 0345 24 0915 24 1200 24 1637   BP: 118/75 111/78 118/76 121/81   Pulse: 72 84 88    Resp: 16 16 16 16   Temp: 98.2 °F (36.8 °C) 98 °F (36.7 °C) 98 °F (36.7 °C) 98.4 °F (36.9 °C)   TempSrc: Oral Oral Oral    SpO2: 96% 94% 95% 94%   Weight:       Height:             Physical Exam:      General: NAD  Eyes: EOMI  ENT: neck supple  Cardiovascular: Regular rate.  Respiratory: Clear to auscultation  Gastrointestinal: Soft, non tender  Genitourinary: no suprapubic tenderness  Musculoskeletal: No edema  Skin: warm, dry  Neuro: Alert.  Psych: Mood appropriate.     Medications:   Medications:    aspirin  81 mg Oral Daily    [START ON 2024] clopidogrel  75 mg Oral Daily    sodium chloride flush  5-40 mL IntraVENous 2 times per day    atorvastatin  80 mg Oral Nightly      Infusions:    sodium chloride       PRN Meds: labetalol, 10 mg, Q4H PRN  sodium chloride flush, 5-40 mL, PRN  sodium chloride, ,  done  -MRI brain showed patchy infarction of the right parietal lobe and watershed territories of the right frontal lobe and occipital temporal lobe  -A1c 5.9  -Statin, APT  -PT OT /SLP  -Neuro-checks  - planned for right carotid angioplasty and stenting ~ 1/30     Hyperlipidemia  -Continue statin     Hypertension-  - Patient takes lisinopril at home     Hyperkalemia  -Mild monitor.        Diet ADULT DIET; Regular   DVT Prophylaxis [] Lovenox, [x]  Heparin, [] SCDs, [] Ambulation,  [] Eliquis, [] Xarelto  [] Coumadin   Code Status Full Code   Disposition From: Home  Expected Disposition: Home   Estimated Date of Discharge: TBD     Surrogate Decision Maker/ POA       Personally reviewed Lab Studies and Imaging         Electronically signed by Kirsten Palacios MD on 1/27/2024 at 8:36 PM

## 2024-01-28 NOTE — PLAN OF CARE
Problem: Discharge Planning  Goal: Discharge to home or other facility with appropriate resources  Outcome: Progressing  Flowsheets (Taken 1/27/2024 2252)  Discharge to home or other facility with appropriate resources:   Identify barriers to discharge with patient and caregiver   Arrange for needed discharge resources and transportation as appropriate     Problem: Chronic Conditions and Co-morbidities  Goal: Patient's chronic conditions and co-morbidity symptoms are monitored and maintained or improved  Outcome: Progressing  Flowsheets (Taken 1/27/2024 2252)  Care Plan - Patient's Chronic Conditions and Co-Morbidity Symptoms are Monitored and Maintained or Improved:   Monitor and assess patient's chronic conditions and comorbid symptoms for stability, deterioration, or improvement   Collaborate with multidisciplinary team to address chronic and comorbid conditions and prevent exacerbation or deterioration   Update acute care plan with appropriate goals if chronic or comorbid symptoms are exacerbated and prevent overall improvement and discharge     Problem: Neurosensory - Adult  Goal: Achieves stable or improved neurological status  1/27/2024 2252 by Artem Thompson, RN  Outcome: Progressing  Flowsheets (Taken 1/27/2024 2252)  Achieves stable or improved neurological status: Assess for and report changes in neurological status       Problem: ABCDS Injury Assessment  Goal: Absence of physical injury  Outcome: Progressing  Flowsheets (Taken 1/27/2024 2252)  Absence of Physical Injury: Implement safety measures based on patient assessment     Problem: Safety - Adult  Goal: Free from fall injury  Outcome: Progressing  Flowsheets (Taken 1/27/2024 2252)  Free From Fall Injury:   Instruct family/caregiver on patient safety   Based on caregiver fall risk screen, instruct family/caregiver to ask for assistance with transferring infant if caregiver noted to have fall risk factors     Problem: Pain  Goal: Verbalizes/displays

## 2024-01-28 NOTE — PROGRESS NOTES
NEUROSURGERY PROGRESS NOTE    LEYDI COLLADO   [id number]   1955 1/28/2024    Interval History:  [facility] Day #6    Subjective: resting in bed, awake, reading. No complaints . No family present.      Objective:  /70   Pulse 79   Temp 97.9 °F (36.6 °C) (Oral)   Resp 16   Ht 1.651 m (5' 5\")   Wt 88.8 kg (195 lb 12.3 oz)   SpO2 93%   BMI 32.58 kg/m²     Labs:  Recent Labs     01/28/24  0521         CO2 22   BUN 17   CREATININE 0.9   GLUCOSE 101*       Recent Labs     01/26/24  0619 01/28/24  0521   WBC 8.4 9.1   RBC 4.62 4.77       Radiological Findings:  CTA HEAD NECK W CONTRAST   Final Result      High-grade, hemodynamically significant short segment stenosis of the origin of   the right internal carotid artery secondary to calcified and noncalcified   atherosclerotic plaque.      Resolution of a small amount of associated thrombus on prior study since prior   study from January 22, 2024.      Minimal atherosclerotic disease of the left carotid bifurcation without   significant luminal narrowing.         PROCEDURE: CT ANGIOGRAPHY HEAD WITH/WITHOUT CONTRAST      INDICATION: eval R ICA stenosis/thrombus      COMPARISON: January 22, 2024      TECHNIQUE: Axial CT imaging obtained through the head prior to and following   administration of IV contrast. Axial images, multiplanar reformatted images, and   maximum intensity projection images were reviewed for CT angiographic technique.   IV contrast: 75 mL Isovue 370.      FINDINGS:      ANTERIOR CIRCULATION: The intracranial internal carotid arteries, anterior   cerebral arteries, and middle cerebral arteries demonstrate no occlusion or   stenosis. Calcified atherosclerotic plaque of the distal internal carotid   arteries bilaterally without hemodynamically significant luminal narrowing. No   evidence for aneurysm or arteriovenous malformation.      POSTERIOR CIRCULATION: The bilateral vertebral arteries, basilar artery and

## 2024-01-29 LAB
DEPRECATED RDW RBC AUTO: 16.8 % (ref 12.4–15.4)
HCT VFR BLD AUTO: 42.1 % (ref 36–48)
HGB BLD-MCNC: 13.5 G/DL (ref 12–16)
MCH RBC QN AUTO: 28.3 PG (ref 26–34)
MCHC RBC AUTO-ENTMCNC: 32.1 G/DL (ref 31–36)
MCV RBC AUTO: 88.2 FL (ref 80–100)
PLATELET # BLD AUTO: 175 K/UL (ref 135–450)
PMV BLD AUTO: 9 FL (ref 5–10.5)
RBC # BLD AUTO: 4.78 M/UL (ref 4–5.2)
WBC # BLD AUTO: 7.9 K/UL (ref 4–11)

## 2024-01-29 PROCEDURE — 6370000000 HC RX 637 (ALT 250 FOR IP): Performed by: INTERNAL MEDICINE

## 2024-01-29 PROCEDURE — 2580000003 HC RX 258: Performed by: INTERNAL MEDICINE

## 2024-01-29 PROCEDURE — 6370000000 HC RX 637 (ALT 250 FOR IP): Performed by: STUDENT IN AN ORGANIZED HEALTH CARE EDUCATION/TRAINING PROGRAM

## 2024-01-29 PROCEDURE — 2060000000 HC ICU INTERMEDIATE R&B

## 2024-01-29 PROCEDURE — 99231 SBSQ HOSP IP/OBS SF/LOW 25: CPT | Performed by: NURSE PRACTITIONER

## 2024-01-29 PROCEDURE — 85027 COMPLETE CBC AUTOMATED: CPT

## 2024-01-29 PROCEDURE — 36415 COLL VENOUS BLD VENIPUNCTURE: CPT

## 2024-01-29 RX ADMIN — ASPIRIN 81 MG 81 MG: 81 TABLET ORAL at 08:22

## 2024-01-29 RX ADMIN — CLOPIDOGREL BISULFATE 75 MG: 75 TABLET ORAL at 08:22

## 2024-01-29 RX ADMIN — SODIUM CHLORIDE, PRESERVATIVE FREE 10 ML: 5 INJECTION INTRAVENOUS at 08:22

## 2024-01-29 RX ADMIN — ATORVASTATIN CALCIUM 80 MG: 80 TABLET, FILM COATED ORAL at 21:31

## 2024-01-29 RX ADMIN — SODIUM CHLORIDE, PRESERVATIVE FREE 10 ML: 5 INJECTION INTRAVENOUS at 21:31

## 2024-01-29 ASSESSMENT — PAIN SCALES - GENERAL
PAINLEVEL_OUTOF10: 0

## 2024-01-29 NOTE — PROGRESS NOTES
NEUROSURGERY PROGRESS NOTE    LEYDI COLLADO   [id number]   1955 1/29/2024    Interval History:  [facility] Day #7    Subjective: resting in bed watching TV, son at bedside. Discussed that angioplasty/stent may not be until Wednesday due to scheduling issues. She is frustrated because she wants to go home but understands    Objective:  /78   Pulse 75   Temp 98.2 °F (36.8 °C) (Oral)   Resp 18   Ht 1.651 m (5' 5\")   Wt 88.8 kg (195 lb 12.3 oz)   SpO2 97%   BMI 32.58 kg/m²     Labs:  Recent Labs     01/28/24  0521         CO2 22   BUN 17   CREATININE 0.9   GLUCOSE 101*       Recent Labs     01/28/24  0521 01/29/24  0756   WBC 9.1 7.9   RBC 4.77 4.78       Radiological Findings:  CTA HEAD NECK W CONTRAST   Final Result      High-grade, hemodynamically significant short segment stenosis of the origin of   the right internal carotid artery secondary to calcified and noncalcified   atherosclerotic plaque.      Resolution of a small amount of associated thrombus on prior study since prior   study from January 22, 2024.      Minimal atherosclerotic disease of the left carotid bifurcation without   significant luminal narrowing.         PROCEDURE: CT ANGIOGRAPHY HEAD WITH/WITHOUT CONTRAST      INDICATION: eval R ICA stenosis/thrombus      COMPARISON: January 22, 2024      TECHNIQUE: Axial CT imaging obtained through the head prior to and following   administration of IV contrast. Axial images, multiplanar reformatted images, and   maximum intensity projection images were reviewed for CT angiographic technique.   IV contrast: 75 mL Isovue 370.      FINDINGS:      ANTERIOR CIRCULATION: The intracranial internal carotid arteries, anterior   cerebral arteries, and middle cerebral arteries demonstrate no occlusion or   stenosis. Calcified atherosclerotic plaque of the distal internal carotid   arteries bilaterally without hemodynamically significant luminal narrowing. No   evidence for

## 2024-01-29 NOTE — PLAN OF CARE
Problem: Discharge Planning  Goal: Discharge to home or other facility with appropriate resources  Outcome: Progressing     Problem: Chronic Conditions and Co-morbidities  Goal: Patient's chronic conditions and co-morbidity symptoms are monitored and maintained or improved  Outcome: Progressing     Problem: Neurosensory - Adult  Goal: Achieves stable or improved neurological status  Outcome: Progressing     Problem: ABCDS Injury Assessment  Goal: Absence of physical injury  1/28/2024 1932 by Vilma Montes RN  Outcome: Progressing     Problem: Safety - Adult  Goal: Free from fall injury  Outcome: Progressing    Fall risk precaution in place. Bed is locked and in lowest position. 2/4 side rails are up. Call light with in reach. Fall risk bracelet in place, non slip socks on.frequent check on patient. free from falls at this time.will continue to monitor.   Problem: Pain  Goal: Verbalizes/displays adequate comfort level or baseline comfort level  Outcome: Progressing

## 2024-01-29 NOTE — PROGRESS NOTES
Q8H PRN   Or  ondansetron, 4 mg, Q6H PRN  polyethylene glycol, 17 g, Daily PRN        Labs and Imaging   MRI BRAIN WO CONTRAST    Result Date: 1/23/2024  EXAM: MRI BRAIN WO  EXTRACRANIAL SOFT TISSUES: Normal     1. Patchy areas of acute infarction involving right parietal lobe cortex and subcortical white matter and to a lesser extent the superficial watershed territories at the right frontal lobe and occipital temporal lobe compatible with thromboembolic disease. 2. No intracranial hemorrhage 3. Mild chronic small vessel ischemia and atrophy Electronically signed by Hi Brody MD    CTA HEAD NECK W CONTRAST    Result Date: 1/22/2024   1. Severe atherosclerotic disease of the proximal right cervical internal carotid artery resulting in 90% diameter stenosis.  Small pedunculated intraluminal thrombus is present at this location. 2. Small acute infarct within the right frontal parietal lobe. 3. No intracranial large vessel occlusion or aneurysm.     XR CHEST PORTABLE  Result Date: 1/22/2024  1. Bibasilar atelectasis and opacification of the retrocardiac left lung, also favored to represent atelectasis.  Correlate clinically.  The lungs otherwise clear. 2. The cardiac silhouette appears enlarged, may be projectional in nature.     CT HEAD WO CONTRAST    Result Date: 1/22/2024  1. Loss of gray-white matter differentiation within the right parietal lobe concerning for an acute infarct. Findings were discussed with TERRY PERKINS at 10:24 am on 1/22/2024.       CBC:   Recent Labs     01/28/24  0521 01/29/24  0756   WBC 9.1 7.9   HGB 13.7 13.5    175       BMP:    Recent Labs     01/28/24  0521      K 4.5      CO2 22   BUN 17   CREATININE 0.9   GLUCOSE 101*       Hepatic:   No results for input(s): \"AST\", \"ALT\", \"ALB\", \"BILITOT\", \"ALKPHOS\" in the last 72 hours.    Lipids:   Lab Results   Component Value Date/Time    CHOL 133 01/24/2024 04:29 AM    HDL 41 01/24/2024 04:29 AM    TRIG 103 01/24/2024  be done  -MRI brain showed patchy infarction of the right parietal lobe and watershed territories of the right frontal lobe and occipital temporal lobe  -A1c 5.9  -Statin, APT  -PT OT /SLP  -Neuro-checks  - planned for right carotid angioplasty and stenting ~ 1/30     Hyperlipidemia  -Continue statin     Hypertension-  - Patient takes lisinopril at home     Hyperkalemia  -Mild monitor.        Diet ADULT DIET; Regular   DVT Prophylaxis [] Lovenox, [x]  Heparin, [] SCDs, [] Ambulation,  [] Eliquis, [] Xarelto  [] Coumadin   Code Status Full Code   Disposition From: Home  Tentative plan for right carotid angioplasty and stenting 1/30   Estimated Date of Discharge: 1/31/2024  PT/OT eval     Surrogate Decision Maker/ POA       Personally reviewed Lab Studies and Imaging         Electronically signed by Kirsten Palacios MD on 1/29/2024 at 5:45 PM

## 2024-01-29 NOTE — PROGRESS NOTES
Patient alert and oriented x4. Denies any pain. Ambulating x1 SBA. Tolerating well. NIH 0. Neuro assessment WDL. All needs met at this time. Will continue to monitor.

## 2024-01-29 NOTE — CARE COORDINATION
Patient is from home with family, has 24/7 supervision, plans for right carotid angioplasty and stenting with scheduled for Wednesday 1/31.  CM will continue to follow if any discharge needs arise.       ________________________________________________________________________________________  PT AM-PAC: 24 / 24 per last evaluation on: 1/29    OT AM-PAC: 21 / 24 per last evaluation on: 1/29      Marce Staples RN  [facility]  Case Management Department  Ph: [phone]  Fax: [phone]

## 2024-01-29 NOTE — PLAN OF CARE
Problem: Discharge Planning  Goal: Discharge to home or other facility with appropriate resources  1/29/2024 0743 by Jeny White, RN  Outcome: Progressing     Problem: Chronic Conditions and Co-morbidities  Goal: Patient's chronic conditions and co-morbidity symptoms are monitored and maintained or improved  1/29/2024 0743 by Jeny White, RN  Outcome: Progressing     Problem: Neurosensory - Adult  Goal: Achieves stable or improved neurological status  1/29/2024 0743 by Jeny White, RN  Outcome: Progressing

## 2024-01-29 NOTE — PLAN OF CARE
Problem: ABCDS Injury Assessment  Goal: Absence of physical injury  1/28/2024 1934 by Clint Staley, RN  Note: Alert oriented times 4 , gait steady , no pain , plan sten and angio the 30 th 1/28/2024 1932 by Vilma Montes, RN  Outcome: Progressing  1/28/2024 1910 by Clint Staley, RN  Note: Ambulated in room , gait steady , no pain , nih

## 2024-01-29 NOTE — PROGRESS NOTES
Pt is A&O x4, VSS, RA. NIH 0, follow commands. No any acute neuro changes.   Up x1 with SBA, tolerating ambulation. Voiding adequately with BRP. Resting comfortably.  All fall precaution is in place. Call light is within the reach.

## 2024-01-30 LAB
ANION GAP SERPL CALCULATED.3IONS-SCNC: 11 MMOL/L (ref 3–16)
BASOPHILS # BLD: 0.1 K/UL (ref 0–0.2)
BASOPHILS NFR BLD: 0.8 %
BUN SERPL-MCNC: 21 MG/DL (ref 7–20)
CALCIUM SERPL-MCNC: 9.4 MG/DL (ref 8.3–10.6)
CHLORIDE SERPL-SCNC: 103 MMOL/L (ref 99–110)
CO2 SERPL-SCNC: 23 MMOL/L (ref 21–32)
CREAT SERPL-MCNC: 0.9 MG/DL (ref 0.6–1.2)
DEPRECATED RDW RBC AUTO: 17.7 % (ref 12.4–15.4)
EOSINOPHIL # BLD: 0.2 K/UL (ref 0–0.6)
EOSINOPHIL NFR BLD: 2.9 %
GFR SERPLBLD CREATININE-BSD FMLA CKD-EPI: >60 ML/MIN/{1.73_M2}
GLUCOSE SERPL-MCNC: 95 MG/DL (ref 70–99)
HCT VFR BLD AUTO: 42.1 % (ref 36–48)
HGB BLD-MCNC: 13.4 G/DL (ref 12–16)
INR PPP: 1.04 (ref 0.84–1.16)
LYMPHOCYTES # BLD: 1.9 K/UL (ref 1–5.1)
LYMPHOCYTES NFR BLD: 22.7 %
MCH RBC QN AUTO: 28.8 PG (ref 26–34)
MCHC RBC AUTO-ENTMCNC: 31.8 G/DL (ref 31–36)
MCV RBC AUTO: 90.6 FL (ref 80–100)
MONOCYTES # BLD: 0.8 K/UL (ref 0–1.3)
MONOCYTES NFR BLD: 9.2 %
NEUTROPHILS # BLD: 5.4 K/UL (ref 1.7–7.7)
NEUTROPHILS NFR BLD: 64.4 %
PLATELET # BLD AUTO: 179 K/UL (ref 135–450)
PMV BLD AUTO: 9.3 FL (ref 5–10.5)
POTASSIUM SERPL-SCNC: 4.2 MMOL/L (ref 3.5–5.1)
PROTHROMBIN TIME: 13.6 SEC (ref 11.5–14.8)
RBC # BLD AUTO: 4.65 M/UL (ref 4–5.2)
SODIUM SERPL-SCNC: 137 MMOL/L (ref 136–145)
WBC # BLD AUTO: 8.4 K/UL (ref 4–11)

## 2024-01-30 PROCEDURE — 2580000003 HC RX 258: Performed by: INTERNAL MEDICINE

## 2024-01-30 PROCEDURE — 85025 COMPLETE CBC W/AUTO DIFF WBC: CPT

## 2024-01-30 PROCEDURE — 80048 BASIC METABOLIC PNL TOTAL CA: CPT

## 2024-01-30 PROCEDURE — 36415 COLL VENOUS BLD VENIPUNCTURE: CPT

## 2024-01-30 PROCEDURE — 2060000000 HC ICU INTERMEDIATE R&B

## 2024-01-30 PROCEDURE — 6370000000 HC RX 637 (ALT 250 FOR IP): Performed by: STUDENT IN AN ORGANIZED HEALTH CARE EDUCATION/TRAINING PROGRAM

## 2024-01-30 PROCEDURE — 85610 PROTHROMBIN TIME: CPT

## 2024-01-30 PROCEDURE — 6370000000 HC RX 637 (ALT 250 FOR IP): Performed by: INTERNAL MEDICINE

## 2024-01-30 PROCEDURE — 99231 SBSQ HOSP IP/OBS SF/LOW 25: CPT | Performed by: NURSE PRACTITIONER

## 2024-01-30 RX ADMIN — SODIUM CHLORIDE, PRESERVATIVE FREE 10 ML: 5 INJECTION INTRAVENOUS at 20:26

## 2024-01-30 RX ADMIN — SODIUM CHLORIDE, PRESERVATIVE FREE 10 ML: 5 INJECTION INTRAVENOUS at 08:05

## 2024-01-30 RX ADMIN — CLOPIDOGREL BISULFATE 75 MG: 75 TABLET ORAL at 08:04

## 2024-01-30 RX ADMIN — ATORVASTATIN CALCIUM 80 MG: 80 TABLET, FILM COATED ORAL at 20:26

## 2024-01-30 RX ADMIN — ASPIRIN 81 MG 81 MG: 81 TABLET ORAL at 08:04

## 2024-01-30 ASSESSMENT — PAIN SCALES - GENERAL
PAINLEVEL_OUTOF10: 0
PAINLEVEL_OUTOF10: 0

## 2024-01-30 NOTE — PROGRESS NOTES
Pt alert and oriented x4, VSS on room air. Denies pain. Ambulates to bathroom SBA. Voiding adequately. Tolerating oral intake well. No changes in neuro status. Plan for procedure tomorrow at 0930, per nsx notes. Fall precautions in place, call light within reach.

## 2024-01-30 NOTE — PLAN OF CARE
Problem: Chronic Conditions and Co-morbidities  Goal: Patient's chronic conditions and co-morbidity symptoms are monitored and maintained or improved  Outcome: Progressing     Problem: Neurosensory - Adult  Goal: Achieves stable or improved neurological status  Outcome: Progressing     Problem: ABCDS Injury Assessment  Goal: Absence of physical injury  1/30/2024 0736 by Kelle Chávez, RN  Outcome: Progressing     Problem: Safety - Adult  Goal: Free from fall injury  1/30/2024 0736 by Kelle Chávez, RN  Outcome: Progressing   -fall precautions in place, call light within reach

## 2024-01-30 NOTE — PROGRESS NOTES
V2.0    [facility] [place]      Name:  Rosalba Wilson /Age/Sex: 1955  (68 y.o. female)   MRN & CSN:  [id number] & [id number] Encounter Date/Time: 2024 8:04 AM EST   Location:  [facility]/5511-01 PCP: Georgia Martinez APRN - CNP     Attending:Kirsten Palacios MD       Hospital Day: 9    Subjective:     Chief Complaint: Left hand weakness    Rosalba Wilson is a 68 y.o. female of [place] origin, who presented with L arm weakness    For about 2 days she found some numbness and \"off sensation\" in her arm, patient was not able to lift her arm..  Denies headache.  No weakness of legs     Patient went to the ED, she was felt to be out of window for tPA.   She was transferred here for further evaluation.    Review of Systems:      Pertinent positives and negatives discussed in HPI    Objective:     Intake/Output Summary (Last 24 hours) at 2024 1654  Last data filed at 2024 1649  Gross per 24 hour   Intake 880 ml   Output --   Net 880 ml        Vitals:   Vitals:    24 1108 24 1500 24 1518 24 1621   BP: 124/68 118/78  110/70   Pulse: 66 76  77   Resp: 16 16  16   Temp: 98 °F (36.7 °C) 98.7 °F (37.1 °C) 98.4 °F (36.9 °C) 98.1 °F (36.7 °C)   TempSrc: Oral Oral Oral Oral   SpO2: 96% 95%  96%   Weight:       Height:             Physical Exam:      General: NAD  Eyes: EOMI  ENT: neck supple  Cardiovascular: Regular rate.  Respiratory: Clear to auscultation  Gastrointestinal: Soft, non tender  Genitourinary: no suprapubic tenderness  Musculoskeletal: No edema  Skin: warm, dry  Neuro: Alert.  Psych: Mood appropriate.     Medications:   Medications:    aspirin  81 mg Oral Daily    clopidogrel  75 mg Oral Daily    sodium chloride flush  5-40 mL IntraVENous 2 times per day    atorvastatin  80 mg Oral Nightly      Infusions:    sodium chloride       PRN Meds: labetalol, 10 mg, Q4H PRN  sodium chloride flush, 5-40 mL, PRN  sodium chloride, , PRN  ondansetron, 4 mg, Q8H PRN

## 2024-01-30 NOTE — PLAN OF CARE
Problem: Discharge Planning  Goal: Discharge to home or other facility with appropriate resources  Outcome: Progressing  Note: CM is following for discharge.   Flowsheets  Taken 1/30/2024 0000  Discharge to home or other facility with appropriate resources:   Identify barriers to discharge with patient and caregiver   Arrange for needed discharge resources and transportation as appropriate   Identify discharge learning needs (meds, wound care, etc)  Taken 1/29/2024 2000  Discharge to home or other facility with appropriate resources:   Identify barriers to discharge with patient and caregiver   Arrange for needed discharge resources and transportation as appropriate   Identify discharge learning needs (meds, wound care, etc)     Problem: ABCDS Injury Assessment  Goal: Absence of physical injury  Outcome: Progressing  Note: Pt free from physical injury.     Problem: Pain  Goal: Verbalizes/displays adequate comfort level or baseline comfort level  Outcome: Progressing

## 2024-01-30 NOTE — CARE COORDINATION
Patient is from home with family, has 24/7 supervision, plans for right carotid angioplasty and stenting with scheduled for Wednesday 1/31.  CM will continue to follow if any discharge needs arise.                   ________________________________________________________________________________________  PT AM-PAC: 24 / 24 per last evaluation on: 1/29     OT AM-PAC: 21 / 24 per last evaluation on: 1/29       Marce Staples RN, BSN,    Ortho/Neuro   [phone]

## 2024-01-30 NOTE — PROGRESS NOTES
Current NIHSS 0    Nursing Core Measures for Stroke:   [x]   Education template documentation (STROKE/TIA). Please select only risk factors that are applicable to patient when selecting risk factors.  [x]   Care Plan template documentation (Physiologic Instability - Neurosensory). Selecting this will add care plan rows to the flowsheet under the Neuro section of Head to Toe.  []   Verified Swallow Screen completed prior to PO intake of food, drink, medications>          Please verify correct medication route prior to administration for intubated patients, patients who can not swallow or have alternative routes of intake (NG, OG, AK), etc  [x]   VTE Prophylaxis: SCDs ordered/addressed; SCDs: On           (As a reminder, ASA, Plavix, and TPA/TNK are not VTE prophylaxis.)    Reviewed the Following Education with Patient and/or Family:   - Personalized risk factors for patient, along with changes, modifications that will help prevent stroke.  - Signs and Symptoms of Stroke: (Facial droop, weakness/numbness especially on one side, speech difficulty, sudden confusion, sudden loss of vision, sudden severe headache, sudden loss of balance or having difficulty walking, syncope, or seizure)  - How to activate EMS (911)   - Importance of Follow Up Appointments at Discharge   - Importance of Compliance with Medications Prescribed at Discharge  - Available community resources and stroke advocacy groups if needed    Patient and/or family member: verbalized understanding.     Stroke Education booklet given to patient/family (or verified, if given already), which reviews above information. yes         Electronically signed by Kelsye Chi RN on 1/30/2024 at 2:47 AM

## 2024-01-30 NOTE — PROGRESS NOTES
Pt procedure consent signed with pt and family member at bedside. Consent was verified by charge nurse and put in pt chart.

## 2024-01-30 NOTE — PROGRESS NOTES
NEUROSURGERY PROGRESS NOTE    LEYDI COLLADO   [id number]   1955 1/30/2024    Interval History: [facility] Day #8    Subjective:   used for visit, anticipating stent placement tomorrow. Says that Dr. Balbuena answered her and her family's questions     Objective:  /78   Pulse 100   Temp 97.5 °F (36.4 °C) (Oral)   Resp 16   Ht 1.651 m (5' 5\")   Wt 88.8 kg (195 lb 12.3 oz)   SpO2 95%   BMI 32.58 kg/m²     Labs:  Recent Labs     01/28/24  0521         CO2 22   BUN 17   CREATININE 0.9   GLUCOSE 101*       Recent Labs     01/28/24  0521 01/29/24  0756   WBC 9.1 7.9   RBC 4.77 4.78       Radiological Findings:  CTA HEAD NECK W CONTRAST   Final Result      High-grade, hemodynamically significant short segment stenosis of the origin of   the right internal carotid artery secondary to calcified and noncalcified   atherosclerotic plaque.      Resolution of a small amount of associated thrombus on prior study since prior   study from January 22, 2024.      Minimal atherosclerotic disease of the left carotid bifurcation without   significant luminal narrowing.         PROCEDURE: CT ANGIOGRAPHY HEAD WITH/WITHOUT CONTRAST      INDICATION: eval R ICA stenosis/thrombus      COMPARISON: January 22, 2024      TECHNIQUE: Axial CT imaging obtained through the head prior to and following   administration of IV contrast. Axial images, multiplanar reformatted images, and   maximum intensity projection images were reviewed for CT angiographic technique.   IV contrast: 75 mL Isovue 370.      FINDINGS:      ANTERIOR CIRCULATION: The intracranial internal carotid arteries, anterior   cerebral arteries, and middle cerebral arteries demonstrate no occlusion or   stenosis. Calcified atherosclerotic plaque of the distal internal carotid   arteries bilaterally without hemodynamically significant luminal narrowing. No   evidence for aneurysm or arteriovenous malformation.      POSTERIOR  18

## 2024-01-30 NOTE — PROGRESS NOTES
Pt is alert and oriented X4, follows commands. VS taken and recorded. SPO2 maintained at RA. Pt denies pain. Is on oral diet and tolerating well. Pt voiding with BRP X1 SBA and voiding adequately. NO BM during this shift. All fall precautions in place, call light within reach, free from fall injury. Plan of care ongoing.

## 2024-01-31 ENCOUNTER — APPOINTMENT (OUTPATIENT)
Dept: VASCULAR LAB | Age: 69
End: 2024-01-31
Attending: SURGERY
Payer: MEDICARE

## 2024-01-31 ENCOUNTER — APPOINTMENT (OUTPATIENT)
Dept: INTERVENTIONAL RADIOLOGY/VASCULAR | Age: 69
End: 2024-01-31
Attending: SURGERY
Payer: MEDICARE

## 2024-01-31 PROCEDURE — C1884 EMBOLIZATION PROTECT SYST: HCPCS

## 2024-01-31 PROCEDURE — B31F1ZZ FLUOROSCOPY OF LEFT VERTEBRAL ARTERY USING LOW OSMOLAR CONTRAST: ICD-10-PCS | Performed by: STUDENT IN AN ORGANIZED HEALTH CARE EDUCATION/TRAINING PROGRAM

## 2024-01-31 PROCEDURE — 99153 MOD SED SAME PHYS/QHP EA: CPT

## 2024-01-31 PROCEDURE — 99152 MOD SED SAME PHYS/QHP 5/>YRS: CPT

## 2024-01-31 PROCEDURE — 36225 PLACE CATH SUBCLAVIAN ART: CPT

## 2024-01-31 PROCEDURE — 93880 EXTRACRANIAL BILAT STUDY: CPT

## 2024-01-31 PROCEDURE — 37215 TRANSCATH STENT CCA W/EPS: CPT

## 2024-01-31 PROCEDURE — B3151ZZ FLUOROSCOPY OF BILATERAL COMMON CAROTID ARTERIES USING LOW OSMOLAR CONTRAST: ICD-10-PCS | Performed by: STUDENT IN AN ORGANIZED HEALTH CARE EDUCATION/TRAINING PROGRAM

## 2024-01-31 PROCEDURE — C1760 CLOSURE DEV, VASC: HCPCS

## 2024-01-31 PROCEDURE — 36223 PLACE CATH CAROTID/INOM ART: CPT

## 2024-01-31 PROCEDURE — B31C1ZZ FLUOROSCOPY OF BILATERAL EXTERNAL CAROTID ARTERIES USING LOW OSMOLAR CONTRAST: ICD-10-PCS | Performed by: STUDENT IN AN ORGANIZED HEALTH CARE EDUCATION/TRAINING PROGRAM

## 2024-01-31 PROCEDURE — 2580000003 HC RX 258: Performed by: STUDENT IN AN ORGANIZED HEALTH CARE EDUCATION/TRAINING PROGRAM

## 2024-01-31 PROCEDURE — C1725 CATH, TRANSLUMIN NON-LASER: HCPCS

## 2024-01-31 PROCEDURE — 2580000003 HC RX 258: Performed by: INTERNAL MEDICINE

## 2024-01-31 PROCEDURE — 2709999900 HC NON-CHARGEABLE SUPPLY

## 2024-01-31 PROCEDURE — 037K3DZ DILATION OF RIGHT INTERNAL CAROTID ARTERY WITH INTRALUMINAL DEVICE, PERCUTANEOUS APPROACH: ICD-10-PCS | Performed by: STUDENT IN AN ORGANIZED HEALTH CARE EDUCATION/TRAINING PROGRAM

## 2024-01-31 PROCEDURE — 6360000004 HC RX CONTRAST MEDICATION: Performed by: STUDENT IN AN ORGANIZED HEALTH CARE EDUCATION/TRAINING PROGRAM

## 2024-01-31 PROCEDURE — APPNB15 APP NON BILLABLE TIME 0-15 MINS

## 2024-01-31 PROCEDURE — C1876 STENT, NON-COA/NON-COV W/DEL: HCPCS

## 2024-01-31 PROCEDURE — APPNB30 APP NON BILLABLE TIME 0-30 MINS: Performed by: NURSE PRACTITIONER

## 2024-01-31 PROCEDURE — C1894 INTRO/SHEATH, NON-LASER: HCPCS

## 2024-01-31 PROCEDURE — 6370000000 HC RX 637 (ALT 250 FOR IP): Performed by: INTERNAL MEDICINE

## 2024-01-31 PROCEDURE — 99232 SBSQ HOSP IP/OBS MODERATE 35: CPT

## 2024-01-31 PROCEDURE — 2000000000 HC ICU R&B

## 2024-01-31 PROCEDURE — B3121ZZ FLUOROSCOPY OF LEFT SUBCLAVIAN ARTERY USING LOW OSMOLAR CONTRAST: ICD-10-PCS | Performed by: STUDENT IN AN ORGANIZED HEALTH CARE EDUCATION/TRAINING PROGRAM

## 2024-01-31 PROCEDURE — B3181ZZ FLUOROSCOPY OF BILATERAL INTERNAL CAROTID ARTERIES USING LOW OSMOLAR CONTRAST: ICD-10-PCS | Performed by: STUDENT IN AN ORGANIZED HEALTH CARE EDUCATION/TRAINING PROGRAM

## 2024-01-31 PROCEDURE — B41F1ZZ FLUOROSCOPY OF RIGHT LOWER EXTREMITY ARTERIES USING LOW OSMOLAR CONTRAST: ICD-10-PCS | Performed by: STUDENT IN AN ORGANIZED HEALTH CARE EDUCATION/TRAINING PROGRAM

## 2024-01-31 PROCEDURE — 99024 POSTOP FOLLOW-UP VISIT: CPT | Performed by: NURSE PRACTITIONER

## 2024-01-31 PROCEDURE — C1769 GUIDE WIRE: HCPCS

## 2024-01-31 RX ORDER — ACETAMINOPHEN 325 MG/1
650 TABLET ORAL EVERY 6 HOURS PRN
Status: DISCONTINUED | OUTPATIENT
Start: 2024-01-31 | End: 2024-02-01 | Stop reason: HOSPADM

## 2024-01-31 RX ORDER — SODIUM CHLORIDE 9 MG/ML
INJECTION, SOLUTION INTRAVENOUS CONTINUOUS
Status: ACTIVE | OUTPATIENT
Start: 2024-01-31 | End: 2024-01-31

## 2024-01-31 RX ORDER — TRAMADOL HYDROCHLORIDE 50 MG/1
50 TABLET ORAL EVERY 6 HOURS PRN
Status: DISCONTINUED | OUTPATIENT
Start: 2024-01-31 | End: 2024-02-01 | Stop reason: HOSPADM

## 2024-01-31 RX ORDER — IODIXANOL 270 MG/ML
300 INJECTION, SOLUTION INTRAVASCULAR ONCE
Status: COMPLETED | OUTPATIENT
Start: 2024-01-31 | End: 2024-01-31

## 2024-01-31 RX ADMIN — IODIXANOL 150 ML: 270 INJECTION, SOLUTION INTRAVASCULAR at 11:18

## 2024-01-31 RX ADMIN — SODIUM CHLORIDE, PRESERVATIVE FREE 10 ML: 5 INJECTION INTRAVENOUS at 20:14

## 2024-01-31 RX ADMIN — ATORVASTATIN CALCIUM 80 MG: 80 TABLET, FILM COATED ORAL at 20:14

## 2024-01-31 RX ADMIN — SODIUM CHLORIDE: 9 INJECTION, SOLUTION INTRAVENOUS at 12:08

## 2024-01-31 ASSESSMENT — PAIN SCALES - GENERAL
PAINLEVEL_OUTOF10: 0

## 2024-01-31 NOTE — PROCEDURES
Patient name: Rosalba Wilson     Medical record number: [id number]      YOB: 1955    PROCEDURE DATE: 1/31/24      PROCEDURE:  Diagnostic Cerebral Angiogram  Carotid angioplasty and stenting with distal protection of the right internal carotid artery origin    Preprocedure diagnosis: right symptomatic carotid artery stenosis    Postprocedure diagnosis:  1. There is 60% stenosis of the right internal carotid artery origin.  2. Successful angioplasty and stenting of the right internal carotid artery origin with distal protection.      HISTORY:  Rosalba Wilson is a 68 y.o. year old female who presented to the hospital with left arm numbness and weakness. Workup by the neurology team ultimately revealed an acute right MCA territory ischemic stroke. She was not treated with IV thrombolytics nor endovascular therapy due to delayed presentation time and relatively mild symptoms. CT angiogram revealed severe stenosis of the right iCA origin, with apparent thrombus associated with the stenosis. She was then placed on heparin anticoagulation for a five day course. Follow up CT angiography revealed apparent resolution of the thrombus portion, but persistent severe stenosis secondary to atherosclerotic plaque. Workup did not reveal other apparent causes of stroke, so her stroke etiology was favored to be from the right carotid stenosis and we considered her stenosis as symptomatic. I therefore recommended revascularization, via endarterectomy or carotid stenting. After discussion with the family and patient, she preferred to avoid surgery and we elected for transfemoral carotid angioplasty and stenting. Informed consent was obtained, including detailed discussion with the patient's son and daughter (as the patient does not speak English).    VESSELS CATHETERIZED:  Left common carotid artery  Right common carotid artery  Left subclavian artery  Right subclavian artery  Right femoral artery      CONSENT:  subclavian artery is patent. The origin of the left vertebral artery is normal with no stenosis. The course and caliber of the cervical segments of the right vertebral artery are normal.    LEFT VERTEBRAL ARTERY, INTRACRANIAL VIEWS: The distal cervical and intracranial segments of the left vertebral artery are normal caliber, aside from mild stenosis at the V3-V4 junction. The basilar artery is patent with normal caliber. The left posterior inferior cerebellar artery, both anterior inferior cerebellar arteries, both superior cerebellar arteries and both posterior cerebral arteries fill normally.         Control films:  RIGHT COMMON CAROTID ARTERY, CERVICAL VIEWS: Stored fluorographic loop: The angioplasty balloon is shown during carotid artery angioplasty.    RIGHT COMMON CAROTID ARTERY, CERVICAL VIEWS: Status post angioplasty and stent delivery. The stent is in appropriate position from the ICA to the CCA. The distal protection device remains in place. The ICA remains patent.      Follow up angiography:  RIGHT COMMON CAROTID ARTERY, CERVICAL VIEWS: following angioplasty, stent placement, and removal of the distal protection device, the ICA origin stenosis is improved. The ICA remains patent without vessel injury.     RIGHT INTERNAL CAROTID ARTERY, INTRACRANIAL VIEWS: The ICA terminates in the MCA and YONNY. No intracranial occlusion is seen.       COMPLICATIONS: None immediate.    EBL: minimal    SPECIMEN: none        IMPRESSION:  1. There is 60% stenosis of the right internal carotid artery origin.  2. Successful angioplasty and stenting of the right internal carotid artery origin with distal protection.

## 2024-01-31 NOTE — PROGRESS NOTES
NEUROSURGERY PROGRESS NOTE    LEYDI COLLADO   [id number]   1955 1/31/2024    Interval History: POD 0 s/p right carotid stent placement   Hospital Day #9    Subjective: seen post procedure, has mild discomfort at groin site but otherwise no complaints.     Objective:  BP (!) 84/69   Pulse 79   Temp 97.3 °F (36.3 °C) (Temporal)   Resp 13   Ht 1.651 m (5' 5\")   Wt 88.8 kg (195 lb 12.3 oz)   SpO2 100%   BMI 32.58 kg/m²     Labs:  Recent Labs     01/30/24  1133         CO2 23   BUN 21*   CREATININE 0.9   GLUCOSE 95       Recent Labs     01/29/24  0756 01/30/24  1134   WBC 7.9 8.4   RBC 4.78 4.65   INR  --  1.04       Radiological Findings:  CTA HEAD NECK W CONTRAST   Final Result      High-grade, hemodynamically significant short segment stenosis of the origin of   the right internal carotid artery secondary to calcified and noncalcified   atherosclerotic plaque.      Resolution of a small amount of associated thrombus on prior study since prior   study from January 22, 2024.      Minimal atherosclerotic disease of the left carotid bifurcation without   significant luminal narrowing.         PROCEDURE: CT ANGIOGRAPHY HEAD WITH/WITHOUT CONTRAST      INDICATION: eval R ICA stenosis/thrombus      COMPARISON: January 22, 2024      TECHNIQUE: Axial CT imaging obtained through the head prior to and following   administration of IV contrast. Axial images, multiplanar reformatted images, and   maximum intensity projection images were reviewed for CT angiographic technique.   IV contrast: 75 mL Isovue 370.      FINDINGS:      ANTERIOR CIRCULATION: The intracranial internal carotid arteries, anterior   cerebral arteries, and middle cerebral arteries demonstrate no occlusion or   stenosis. Calcified atherosclerotic plaque of the distal internal carotid   arteries bilaterally without hemodynamically significant luminal narrowing. No   evidence for aneurysm or arteriovenous malformation.       POSTERIOR CIRCULATION: The bilateral vertebral arteries, basilar artery and   posterior cerebral arteries demonstrate no occlusion or stenosis. No evidence   for aneurysm or arteriovenous malformation.      INTRACRANIAL VENOUS SYSTEM: No evidence for intracranial venous thrombosis.      INTRACRANIAL HEMORRHAGE: None.      VENTRICLES: Normal in size and configuration for age.      BRAIN PARENCHYMA: Multifocal areas of acute infarction involving the right   parietal and right frontal lobes, better visualized on recent MR examination. No   new loss of gray-white differentiation.      SKULL: No destructive osseous process or fracture.      PARANASAL SINUSES / MASTOIDS: No acute sinusitis or mastoiditis.      ORBITS: Normal.      EXTRACRANIAL SOFT TISSUES: Normal.      OTHER: None.      IMPRESSION:      No large vessel occlusion or intracranial hemodynamically significant stenoses.            CT HEAD WO CONTRAST   Final Result      1.  No acute intracranial hemorrhage or mass effect.   2.  Recent infarct in the right parietal lobe.      Electronically signed by Yehuda Deleon MD      MRI BRAIN WO CONTRAST   Final Result      1. Patchy areas of acute infarction involving right parietal lobe cortex and subcortical white matter and to a lesser extent the superficial watershed territories at the right frontal lobe and occipital temporal lobe compatible with thromboembolic    disease.   2. No intracranial hemorrhage   3. Mild chronic small vessel ischemia and atrophy      Electronically signed by Hi Brody MD      IR ANGIOGRAM CAROTID CEREBRAL RIGHT    (Results Pending)   VL DUP CAROTID RIGHT    (Results Pending)       Neurologic Exam:  GCS:  4 - Opens eyes on own  5 - Alert and oriented  6 - Follows simple motor commands    Mental Status: Awake, alert, oriented x 4   Language: No aphasia or dysarthria noted  Sensation: Intact to all extremities to light touch    Cranial Nerves:  II: Visual acuity not tested  III, IV, VI:

## 2024-01-31 NOTE — PROGRESS NOTES
Nutrition Note       NUTRITION RECOMMENDATIONS:   1. PO Diet: Continue current diet    2. ONS: not indicated    NUTRITION ASSESSMENT:  Nutritional summary & status: LOS.  Pt out of room for right carotid angioplasty and stenting during attempted encounter.  Pt currently NPO for procedure, however PO intake >76% through adm.  EMR shows wt stable over several years.  No nutrition concerns at this time.  Will continue monitoring per SOC.   Admission/PMH: symptomatic stenosis of rt carotid artery // hypertension, hyperlipidemia    MALNUTRITION ASSESSMENT  Context of Malnutrition: Acute Illness   Malnutrition Status: No malnutrition    NUTRITION DIAGNOSIS   No nutrition diagnosis at this time     NUTRITION INTERVENTION  Food and/or Nutrient Delivery:  Continue Current Diet  Nutrition Education/Counseling:  Education not indicated       The patient will still be monitored per nutrition standards of care.  Consult dietitian if nutrition interventions essential to patient care is needed.     Gloria Mc, MS, RD, LD  [place]:  [phone]  Office:  [phone]

## 2024-01-31 NOTE — BRIEF OP NOTE
Brief Postoperative Note      Patient: Rosalba Wilson  YOB: 1955  MRN: [id number]    Date of Procedure: 1/31/2024    Preop diagnosis: right symptomatic carotid stenosis  Post-Op Diagnosis: Same       Procedure: diagnostic cerebral angiogram; R carotid angioplasty and stenting with distal protection      Assistant:  * No surgical staff found *    Anesthesia: conscious sedation, local anesthetic    Estimated Blood Loss (mL): less than 50     Complications: None    Specimens:   * No specimens in log *    Implants:  * No implants in log *      Drains: * No LDAs found *    Findings:   -angiogram bilateral CCA, subclavian arteries  -60% stenosis R ICA origin  -angioplasty and stenting of the right ICA origin with distal protection  -access R fem, 6F, angioseal hemostasis      Electronically signed by Clint Balbuena MD on 1/31/2024 at 11:39 AM

## 2024-01-31 NOTE — PROGRESS NOTES
NEUROCRITICAL CARE PROGRESS NOTE       Patient Name: Rosalba Wilson YOB: 1955   Sex: Female Age: 68 yrs     CC / Reason for Consult: s/p R ICA stent    Changes over last 24 hours:   -POD 0 Right carotid angioplasty and stenting with Dr. Balbuena     ROS: denies any acute complaints. Denies pain.    ASSESSMENT & RECOMMENDATIONS   Assessment:  -Rosalba Wilson is a 67 y/o woman with left hemiparesis and sensory changes found to have high-grade right ICA stenosis (90%) and associated thrombus which resolved following 5 days of heparin gtt. On MRI she was found to have scattered stroke in the Right cortical, parietal lobe and right sub cortical watershed territories at the right frontal lobe and occipital temporal lobes. She was transitioned to DAPT 1/27 and went for right carotid artery stent with Dr. Balbuena on 1/31.    Plan:  -Continue Aspirin and Plavix   -Continue High intensity statin   -Q1H Neurologic Exams & Vitals  -NIHSS per guidelines   -Lay flat 2 hours post procedure   -Blood pressure goal systolic BP <160 for 24 hours   -Labetalol 10mg IV PRN  -Groin checks per post procedure guidelines  -Continue normal saline 125mL/hr  -Advance diet as tolerated    DEYANIRA Law - CNP   Neurocritical Care   1/31/2024 12:22 PM  [facility]: [facility]  [phone]  HISTORY   Interval History: s/p right carotid stent with Dr. Balbuena 1/31    [facility] Past Medical History:   Diagnosis Date    IFG (impaired fasting glucose)     Shoulder pain, right     Symptomatic stenosis of right carotid artery 01/22/2024    Varicose vein of leg 07/23/2015      Allergies No Known Allergies   Diet ADULT DIET; Regular   Isolation No active isolations     CURRENT SCHEDULED MEDICATIONS   Inpatient Medications     aspirin, 81 mg, Oral, Daily    [COMPLETED] clopidogrel, 300 mg, Oral, Once **FOLLOWED BY** clopidogrel, 75 mg, Oral, Daily    sodium chloride flush, 5-40 mL, IntraVENous, 2 times per day

## 2024-01-31 NOTE — PROGRESS NOTES
Value Date/Time    LABA1C 5.9 01/23/2024 06:06 AM     TSH:   Lab Results   Component Value Date/Time    TSH 1.76 07/01/2015 04:41 PM     Troponin: No results found for: \"TROPONINT\"  Lactic Acid: No results for input(s): \"LACTA\" in the last 72 hours.  BNP: No results for input(s): \"PROBNP\" in the last 72 hours.  UA:  Lab Results   Component Value Date/Time    NITRU POSITIVE 04/04/2023 05:05 AM    COLORU Yellow 04/04/2023 05:05 AM    PHUR 5.5 04/04/2023 05:05 AM    WBCUA 18 04/04/2023 05:05 AM    RBCUA 1 04/04/2023 05:05 AM    MUCUS 1+ 01/05/2011 01:55 PM    BACTERIA 4+ 04/04/2023 05:05 AM    CLARITYU Clear 04/04/2023 05:05 AM    SPECGRAV 1.023 04/04/2023 05:05 AM    LEUKOCYTESUR SMALL 04/04/2023 05:05 AM    UROBILINOGEN 0.2 04/04/2023 05:05 AM    BILIRUBINUR Negative 04/04/2023 05:05 AM    BILIRUBINUR NEGATIVE 01/05/2011 01:55 PM    BLOODU Negative 04/04/2023 05:05 AM    GLUCOSEU Negative 04/04/2023 05:05 AM    GLUCOSEU NEGATIVE 01/05/2011 01:55 PM    KETUA Negative 04/04/2023 05:05 AM     Urine Cultures:   Lab Results   Component Value Date/Time    LABURIN >100,000 CFU/ml 04/04/2023 05:05 AM     Blood Cultures:   Lab Results   Component Value Date/Time    BC No Growth after 4 days of incubation. 04/01/2023 11:17 PM     No results found for: \"BLOODCULT2\"  Organism:   Lab Results   Component Value Date/Time    ORG Escherichia coli 04/04/2023 05:05 AM         Assessment and Recommendations   68 y.o. female with hypertension, hyperlipidemia who presented with left arm weakness    Acute CVA sec to high-grade right ICA stenosis with subocclusive thrombus: POA  -  CT head wo contrast concerning for acute right parietal stroke. CTA w/ 90% cervical ICA stenosis and intraluminal thrombus.  - Follow up CTA done 1/27 after 5 days of heparin with resolution of small thrombus component, but persistence of likely-severe stenosis  -TTE showed normal EF.  Bubble study could not be done  -MRI brain showed patchy infarction of the

## 2024-01-31 NOTE — PROGRESS NOTES
Patient resting in bed. Small hematoma noted on Right Fem site from cath lab, known in report, not changed since closure, surgeon aware and no further orders at this time. Call light within reach and bed alarm on.

## 2024-01-31 NOTE — PROGRESS NOTES
Patient taken down to cath lab for procedure with transport. Chg bath preformed before patient was taken down.    yes

## 2024-01-31 NOTE — PROGRESS NOTES
Cath Lab Pre Procedure Flowsheet     utilized when communicating with patient and explaining the pre-procedure process    Phone consent for procedure obtained with daughter Sandra Hensley     Pre-procedure:    NPO Status: Pt reports Pt has been NPO since midnight.     Contrast / IV Dye Allergy: none reported    Pregnancy Test: N/A.    Prep Sites: Bilateral Groin(s) (shaved)    Pulses: Right DP 1+  Right PT 1+  Left DP 2+  Left PT 1+  Right Fem 1+  Left Fem 1+    Anticoagulants: None.     Antiplatelets: Aspirin. Last Dose: 1/30/24 0804.  Any missed doses:  n/a. and Plavix. Last Dose: 1/30/24 0804.  Any missed doses:  n/a..     Pre EKG Rhythm: NSR    Pre SBP: 123    IV access: L forearm 22 g    NIH Scale: see flowsheets, charted 0 at 0749    Call light within reach, fluids placed at bedside

## 2024-01-31 NOTE — PLAN OF CARE
Problem: Discharge Planning  Goal: Discharge to home or other facility with appropriate resources  Outcome: Progressing  Flowsheets (Taken 1/30/2024 2000)  Discharge to home or other facility with appropriate resources: Identify barriers to discharge with patient and caregiver     Problem: Chronic Conditions and Co-morbidities  Goal: Patient's chronic conditions and co-morbidity symptoms are monitored and maintained or improved  Outcome: Progressing  Flowsheets (Taken 1/30/2024 2000)  Care Plan - Patient's Chronic Conditions and Co-Morbidity Symptoms are Monitored and Maintained or Improved: Monitor and assess patient's chronic conditions and comorbid symptoms for stability, deterioration, or improvement     Problem: Neurosensory - Adult  Goal: Achieves stable or improved neurological status  Outcome: Progressing  Flowsheets (Taken 1/30/2024 2000)  Achieves stable or improved neurological status: Assess for and report changes in neurological status  Goal: Achieves maximal functionality and self care  Recent Flowsheet Documentation  Taken 1/30/2024 2000 by Manju Mitchell RN  Achieves maximal functionality and self care: Monitor swallowing and airway patency with patient fatigue and changes in neurological status     Problem: ABCDS Injury Assessment  Goal: Absence of physical injury  Outcome: Progressing     Problem: Safety - Adult  Goal: Free from fall injury  Outcome: Progressing     Problem: Pain  Goal: Verbalizes/displays adequate comfort level or baseline comfort level  Outcome: Progressing

## 2024-01-31 NOTE — PROGRESS NOTES
4 Eyes Skin Assessment     NAME:  Rosalba Wilson  YOB: 1955  MEDICAL RECORD NUMBER:  [id number]    The patient is being assessed for  Transfer to New Unit    I agree that at least one RN has performed a thorough Head to Toe Skin Assessment on the patient. ALL assessment sites listed below have been assessed.      Areas assessed by both nurses:    Head, Face, Ears, Shoulders, Back, Chest, Arms, Elbows, Hands, Sacrum. Buttock, Coccyx, Ischium, Legs. Feet and Heels, and Under Medical Devices     Scattered bruising noted on admission.         Does the Patient have a Wound? No noted wound(s)       Jose Prevention initiated by RN: No  Wound Care Orders initiated by RN: No    Pressure Injury (Stage 3,4, Unstageable, DTI, NWPT, and Complex wounds) if present, place Wound referral order by RN under : No    New Ostomies, if present place, Ostomy referral order under : No     Nurse 1 eSignature: Electronically signed by Jeanne Bird RN on 1/31/24 at 1:11 PM EST    **SHARE this note so that the co-signing nurse can place an eSignature**    Nurse 2 eSignature: {Esignature:[id number]}

## 2024-02-01 VITALS
SYSTOLIC BLOOD PRESSURE: 99 MMHG | WEIGHT: 195.77 LBS | OXYGEN SATURATION: 97 % | RESPIRATION RATE: 18 BRPM | HEIGHT: 65 IN | DIASTOLIC BLOOD PRESSURE: 69 MMHG | HEART RATE: 82 BPM | TEMPERATURE: 98.2 F | BODY MASS INDEX: 32.62 KG/M2

## 2024-02-01 LAB
ANION GAP SERPL CALCULATED.3IONS-SCNC: 9 MMOL/L (ref 3–16)
BASOPHILS # BLD: 0.1 K/UL (ref 0–0.2)
BASOPHILS NFR BLD: 0.8 %
BUN SERPL-MCNC: 14 MG/DL (ref 7–20)
CALCIUM SERPL-MCNC: 8.9 MG/DL (ref 8.3–10.6)
CHLORIDE SERPL-SCNC: 107 MMOL/L (ref 99–110)
CO2 SERPL-SCNC: 22 MMOL/L (ref 21–32)
CREAT SERPL-MCNC: 0.9 MG/DL (ref 0.6–1.2)
DEPRECATED RDW RBC AUTO: 16.2 % (ref 12.4–15.4)
EOSINOPHIL # BLD: 0.3 K/UL (ref 0–0.6)
EOSINOPHIL NFR BLD: 3.1 %
GFR SERPLBLD CREATININE-BSD FMLA CKD-EPI: >60 ML/MIN/{1.73_M2}
GLUCOSE SERPL-MCNC: 110 MG/DL (ref 70–99)
HCT VFR BLD AUTO: 36.5 % (ref 36–48)
HGB BLD-MCNC: 12.2 G/DL (ref 12–16)
LYMPHOCYTES # BLD: 2.3 K/UL (ref 1–5.1)
LYMPHOCYTES NFR BLD: 22.7 %
MCH RBC QN AUTO: 28.9 PG (ref 26–34)
MCHC RBC AUTO-ENTMCNC: 33.5 G/DL (ref 31–36)
MCV RBC AUTO: 86.4 FL (ref 80–100)
MONOCYTES # BLD: 0.9 K/UL (ref 0–1.3)
MONOCYTES NFR BLD: 9 %
NEUTROPHILS # BLD: 6.5 K/UL (ref 1.7–7.7)
NEUTROPHILS NFR BLD: 64.4 %
PLATELET # BLD AUTO: 172 K/UL (ref 135–450)
PMV BLD AUTO: 9.2 FL (ref 5–10.5)
POTASSIUM SERPL-SCNC: 4.5 MMOL/L (ref 3.5–5.1)
RBC # BLD AUTO: 4.22 M/UL (ref 4–5.2)
SODIUM SERPL-SCNC: 138 MMOL/L (ref 136–145)
WBC # BLD AUTO: 10.1 K/UL (ref 4–11)

## 2024-02-01 PROCEDURE — 6370000000 HC RX 637 (ALT 250 FOR IP): Performed by: STUDENT IN AN ORGANIZED HEALTH CARE EDUCATION/TRAINING PROGRAM

## 2024-02-01 PROCEDURE — 85025 COMPLETE CBC W/AUTO DIFF WBC: CPT

## 2024-02-01 PROCEDURE — 80048 BASIC METABOLIC PNL TOTAL CA: CPT

## 2024-02-01 PROCEDURE — 2580000003 HC RX 258: Performed by: INTERNAL MEDICINE

## 2024-02-01 PROCEDURE — 36415 COLL VENOUS BLD VENIPUNCTURE: CPT

## 2024-02-01 RX ORDER — CLOPIDOGREL BISULFATE 75 MG/1
75 TABLET ORAL DAILY
Qty: 30 TABLET | Refills: 3 | Status: SHIPPED | OUTPATIENT
Start: 2024-02-02 | End: 2024-02-01

## 2024-02-01 RX ORDER — ASPIRIN 81 MG/1
81 TABLET, CHEWABLE ORAL DAILY
Qty: 30 TABLET | Refills: 3 | Status: SHIPPED | OUTPATIENT
Start: 2024-02-02 | End: 2024-06-01

## 2024-02-01 RX ORDER — CLOPIDOGREL BISULFATE 75 MG/1
75 TABLET ORAL DAILY
Qty: 30 TABLET | Refills: 3 | Status: SHIPPED | OUTPATIENT
Start: 2024-02-02 | End: 2024-06-01

## 2024-02-01 RX ORDER — ATORVASTATIN CALCIUM 80 MG/1
80 TABLET, FILM COATED ORAL NIGHTLY
Qty: 30 TABLET | Refills: 3 | Status: SHIPPED | OUTPATIENT
Start: 2024-02-01

## 2024-02-01 RX ADMIN — ASPIRIN 81 MG 81 MG: 81 TABLET ORAL at 07:53

## 2024-02-01 RX ADMIN — CLOPIDOGREL BISULFATE 75 MG: 75 TABLET ORAL at 07:53

## 2024-02-01 RX ADMIN — SODIUM CHLORIDE, PRESERVATIVE FREE 10 ML: 5 INJECTION INTRAVENOUS at 07:37

## 2024-02-01 ASSESSMENT — PAIN SCALES - GENERAL
PAINLEVEL_OUTOF10: 0

## 2024-02-01 NOTE — PLAN OF CARE
Problem: Discharge Planning  Goal: Discharge to home or other facility with appropriate resources  Outcome: Progressing     Problem: Chronic Conditions and Co-morbidities  Goal: Patient's chronic conditions and co-morbidity symptoms are monitored and maintained or improved  Outcome: Progressing     Problem: Neurosensory - Adult  Goal: Achieves stable or improved neurological status  Outcome: Progressing  Goal: Achieves maximal functionality and self care  Outcome: Progressing     Problem: ABCDS Injury Assessment  Goal: Absence of physical injury  Outcome: Progressing     Problem: Safety - Adult  Goal: Free from fall injury  Outcome: Progressing     Problem: Pain  Goal: Verbalizes/displays adequate comfort level or baseline comfort level  Outcome: Progressing

## 2024-02-01 NOTE — PLAN OF CARE
Problem: Discharge Planning  Goal: Discharge to home or other facility with appropriate resources  2/1/2024 1210 by Jeanne Bird RN  Outcome: Adequate for Discharge  Flowsheets (Taken 2/1/2024 0800)  Discharge to home or other facility with appropriate resources:   Identify barriers to discharge with patient and caregiver   Arrange for needed discharge resources and transportation as appropriate   Identify discharge learning needs (meds, wound care, etc)     Problem: Chronic Conditions and Co-morbidities  Goal: Patient's chronic conditions and co-morbidity symptoms are monitored and maintained or improved  2/1/2024 1210 by Jeanne Bird RN  Outcome: Adequate for Discharge  Flowsheets (Taken 2/1/2024 0800)  Care Plan - Patient's Chronic Conditions and Co-Morbidity Symptoms are Monitored and Maintained or Improved:   Collaborate with multidisciplinary team to address chronic and comorbid conditions and prevent exacerbation or deterioration   Monitor and assess patient's chronic conditions and comorbid symptoms for stability, deterioration, or improvement     Problem: Neurosensory - Adult  Goal: Achieves stable or improved neurological status  2/1/2024 1210 by Jeanne Bird RN  Outcome: Adequate for Discharge  Flowsheets (Taken 2/1/2024 0800)  Achieves stable or improved neurological status:   Assess for and report changes in neurological status   Initiate measures to prevent increased intracranial pressure   Maintain blood pressure and fluid volume within ordered parameters to optimize cerebral perfusion and minimize risk of hemorrhage     Problem: Neurosensory - Adult  Goal: Achieves maximal functionality and self care  2/1/2024 1210 by Jeanne Bird RN  Outcome: Adequate for Discharge  Flowsheets (Taken 2/1/2024 0800)  Achieves maximal functionality and self care:   Monitor swallowing and airway patency with patient fatigue and changes in neurological status   Encourage and assist patient to increase  Atrial fibrillation with RVR

## 2024-02-01 NOTE — DISCHARGE SUMMARY
Hospital Discharge Summary    Patient's PCP: Georgia Martinez APRN - CNP  Admit Date: 1/22/2024   Discharge Date: 2/1/2024    Admitting Physician: Dr. Jennifer Ro MD  Discharge Physician: Dr. Kirsten Palacios MD   Consults: neurology and Neurosurgery    HPI: 68 y.o. female of [place] origin, who presented with L arm weakness     For about 2 days she found some numbness and \"off sensation\" in her arm, patient was not able to lift her arm..  Denies headache.  No weakness of legs     Patient went to the ED, she was felt to be out of window for tPA.   She was transferred here for further evaluation.      Brief hospital course:  Given the concern of the patients presentation and the concern of the possible multi-factorial etiology contributing to patients symptomatology. Patient was admitted and evaluated and found to have:       Discharge Diagnoses:     Acute CVA sec to high-grade right ICA stenosis with subocclusive thrombus: POA  -  CT head wo contrast concerning for acute right parietal stroke. CTA w/ 90% cervical ICA stenosis and intraluminal thrombus.  - Follow up CTA done 1/27 after 5 days of heparin with resolution of small thrombus component, but persistence of likely-severe stenosis  -TTE showed normal EF.  Bubble study could not be done  -MRI brain showed patchy infarction of the right parietal lobe and watershed territories of the right frontal lobe and occipital temporal lobe  -A1c 5.9  -Statin, DAPT; o/p f/u  - POD# 1 s/p right carotid stenting for symptomatic carotid stenosis    -PT OT /SLP: DC Home  - O/p F/u with NSGY: plans to call her for telephone checkup ; f/u six months in office with repeat right carotid duplex     Hyperlipidemia  -Continue statin     Hypertension-  - Patient takes lisinopril at home; Bps low-satisfactory. Held. Re-eval o/p with PCP follow up.      Hyperkalemia: medically treated. K 4.5 today  O/p F/u  -Mild monitor.        Physical Exam: BP 99/69   Pulse 82   Temp

## 2024-02-01 NOTE — PROGRESS NOTES
Patient resting in bed. RN called Neurosurgery to ask about rounding as mentioned yesterday a possible discharge time of 11am today. Per Nsurgery Dr. Balbuena to come to bedside to see pt this morning before discharge with MD getting to hospital around 10am today.

## 2024-02-01 NOTE — PROGRESS NOTES
Neurosurgery Progress Note      LOS: 10 days     Interval History:    Pod1 right carotid stenting  Overnight monitored in icu  No issues. Neuro exam stable. Access site normal. Blood pressures appropriate    O:   Vitals:    02/01/24 0800 02/01/24 0844 02/01/24 0900 02/01/24 1000   BP: 102/72  (!) 102/58    Pulse: 75 84 62 73   Resp: 17  19 17   Temp: 98.1 °F (36.7 °C)      TempSrc: Temporal      SpO2: 93% 100% 94% 95%   Weight:       Height:                Intake/Output Summary (Last 24 hours) at 2/1/2024 1103  Last data filed at 2/1/2024 0600  Gross per 24 hour   Intake 2984.97 ml   Output 0 ml   Net 2984.97 ml          Recent Results (from the past 24 hour(s))   Basic Metabolic Panel    Collection Time: 02/01/24  5:36 AM   Result Value Ref Range    Sodium 138 136 - 145 mmol/L    Potassium 4.5 3.5 - 5.1 mmol/L    Chloride 107 99 - 110 mmol/L    CO2 22 21 - 32 mmol/L    Anion Gap 9 3 - 16    Glucose 110 (H) 70 - 99 mg/dL    BUN 14 7 - 20 mg/dL    Creatinine 0.9 0.6 - 1.2 mg/dL    Est, Glom Filt Rate >60 >60    Calcium 8.9 8.3 - 10.6 mg/dL   CBC with Auto Differential    Collection Time: 02/01/24  5:36 AM   Result Value Ref Range    WBC 10.1 4.0 - 11.0 K/uL    RBC 4.22 4.00 - 5.20 M/uL    Hemoglobin 12.2 12.0 - 16.0 g/dL    Hematocrit 36.5 36.0 - 48.0 %    MCV 86.4 80.0 - 100.0 fL    MCH 28.9 26.0 - 34.0 pg    MCHC 33.5 31.0 - 36.0 g/dL    RDW 16.2 (H) 12.4 - 15.4 %    Platelets 172 135 - 450 K/uL    MPV 9.2 5.0 - 10.5 fL    Neutrophils % 64.4 %    Lymphocytes % 22.7 %    Monocytes % 9.0 %    Eosinophils % 3.1 %    Basophils % 0.8 %    Neutrophils Absolute 6.5 1.7 - 7.7 K/uL    Lymphocytes Absolute 2.3 1.0 - 5.1 K/uL    Monocytes Absolute 0.9 0.0 - 1.3 K/uL    Eosinophils Absolute 0.3 0.0 - 0.6 K/uL    Basophils Absolute 0.1 0.0 - 0.2 K/uL     Examined with daughter interpreting    Gen: no distress  Pulm: Easy WOB, symmetric chest rise   Neuro:  Alert, oriented, conversant  EOMI  Face symmetric  Motor 5/5 x4 ext,  no drift  Wound: right groin clean and dry. Dressing in place      Recent Imaging:   VL DUP CAROTID BILATERAL         CTA HEAD NECK W CONTRAST   Final Result      High-grade, hemodynamically significant short segment stenosis of the origin of   the right internal carotid artery secondary to calcified and noncalcified   atherosclerotic plaque.      Resolution of a small amount of associated thrombus on prior study since prior   study from January 22, 2024.      Minimal atherosclerotic disease of the left carotid bifurcation without   significant luminal narrowing.         PROCEDURE: CT ANGIOGRAPHY HEAD WITH/WITHOUT CONTRAST      INDICATION: eval R ICA stenosis/thrombus      COMPARISON: January 22, 2024      TECHNIQUE: Axial CT imaging obtained through the head prior to and following   administration of IV contrast. Axial images, multiplanar reformatted images, and   maximum intensity projection images were reviewed for CT angiographic technique.   IV contrast: 75 mL Isovue 370.      FINDINGS:      ANTERIOR CIRCULATION: The intracranial internal carotid arteries, anterior   cerebral arteries, and middle cerebral arteries demonstrate no occlusion or   stenosis. Calcified atherosclerotic plaque of the distal internal carotid   arteries bilaterally without hemodynamically significant luminal narrowing. No   evidence for aneurysm or arteriovenous malformation.      POSTERIOR CIRCULATION: The bilateral vertebral arteries, basilar artery and   posterior cerebral arteries demonstrate no occlusion or stenosis. No evidence   for aneurysm or arteriovenous malformation.      INTRACRANIAL VENOUS SYSTEM: No evidence for intracranial venous thrombosis.      INTRACRANIAL HEMORRHAGE: None.      VENTRICLES: Normal in size and configuration for age.      BRAIN PARENCHYMA: Multifocal areas of acute infarction involving the right   parietal and right frontal lobes, better visualized on recent MR examination. No   new loss of gray-white

## 2024-02-01 NOTE — PROGRESS NOTES
Patient discharged with all LDAs removed, education completed, and discharge paperwork reviewed. All belongings sent home with pt.

## 2024-02-01 NOTE — CARE COORDINATION
02/01/24 1247   IMM Letter   IMM Letter given to Patient/Family/Significant other/Guardian/POA/by: Manju Guerrero RN 2ND IMM   IMM Letter date given: 02/01/24   IMM Letter time given: 1230     Ms. Wilson is in agreement with discharge within the 4 hour window of presentation of the 2nd IMM.    The pt will return home today. Family will transport.    Manju Guerrero RN  [phone]

## 2024-02-01 NOTE — PLAN OF CARE
Patient remains neurologically intact. Groin site soft with no hematoma, distal pulses intact. No changes to plan of care at this time.     Please call with any questions or exam changes.      DEYANIRA Christopher - CNP   Neurology & Neurocritical Care   1/31/2024 8:33 PM    ICU Patients:   Neurocritical Care Line: [phone]  [facility]: [facility]

## 2024-07-24 ENCOUNTER — TELEPHONE (OUTPATIENT)
Dept: FAMILY MEDICINE CLINIC | Age: 69
End: 2024-07-24

## 2024-07-24 NOTE — TELEPHONE ENCOUNTER
Spoke with pt's daughter, Sandra, to schedule pt for a NPV with Dr. Mckinnon. Scheduled pt for September 24 at 8:30 AM for a 45 min new patient appt. 45 min is per MA due to pt needing an .

## 2024-09-24 ENCOUNTER — OFFICE VISIT (OUTPATIENT)
Dept: FAMILY MEDICINE CLINIC | Age: 69
End: 2024-09-24
Payer: COMMERCIAL

## 2024-09-24 VITALS — BODY MASS INDEX: 30.69 KG/M2 | SYSTOLIC BLOOD PRESSURE: 110 MMHG | DIASTOLIC BLOOD PRESSURE: 80 MMHG | WEIGHT: 184.4 LBS

## 2024-09-24 DIAGNOSIS — Z76.89 ENCOUNTER TO ESTABLISH CARE: ICD-10-CM

## 2024-09-24 DIAGNOSIS — I77.6 VASCULITIS (HCC): ICD-10-CM

## 2024-09-24 DIAGNOSIS — I63.411 CEREBROVASCULAR ACCIDENT (CVA) DUE TO EMBOLISM OF RIGHT MIDDLE CEREBRAL ARTERY (HCC): ICD-10-CM

## 2024-09-24 DIAGNOSIS — Z12.31 ENCOUNTER FOR SCREENING MAMMOGRAM FOR MALIGNANT NEOPLASM OF BREAST: Primary | ICD-10-CM

## 2024-09-24 PROCEDURE — G2211 COMPLEX E/M VISIT ADD ON: HCPCS | Performed by: FAMILY MEDICINE

## 2024-09-24 PROCEDURE — 1123F ACP DISCUSS/DSCN MKR DOCD: CPT | Performed by: FAMILY MEDICINE

## 2024-09-24 PROCEDURE — 99204 OFFICE O/P NEW MOD 45 MIN: CPT | Performed by: FAMILY MEDICINE

## 2024-09-24 RX ORDER — ASPIRIN 81 MG/1
81 TABLET ORAL DAILY
Qty: 90 TABLET | Refills: 1 | Status: SHIPPED | OUTPATIENT
Start: 2024-09-24

## 2024-09-24 RX ORDER — ASPIRIN 81 MG/1
81 TABLET ORAL DAILY
COMMUNITY
End: 2024-09-24 | Stop reason: SDUPTHER

## 2024-09-24 RX ORDER — LISINOPRIL 40 MG/1
40 TABLET ORAL DAILY
Qty: 90 TABLET | Refills: 1 | Status: SHIPPED | OUTPATIENT
Start: 2024-09-24

## 2024-09-24 RX ORDER — ATORVASTATIN CALCIUM 80 MG/1
80 TABLET, FILM COATED ORAL NIGHTLY
Qty: 90 TABLET | Refills: 1 | Status: SHIPPED | OUTPATIENT
Start: 2024-09-24

## 2024-09-24 RX ORDER — LISINOPRIL 40 MG/1
40 TABLET ORAL DAILY
COMMUNITY
End: 2024-09-24 | Stop reason: SDUPTHER

## 2024-09-24 SDOH — ECONOMIC STABILITY: INCOME INSECURITY: HOW HARD IS IT FOR YOU TO PAY FOR THE VERY BASICS LIKE FOOD, HOUSING, MEDICAL CARE, AND HEATING?: NOT HARD AT ALL

## 2024-09-24 SDOH — ECONOMIC STABILITY: FOOD INSECURITY: WITHIN THE PAST 12 MONTHS, THE FOOD YOU BOUGHT JUST DIDN'T LAST AND YOU DIDN'T HAVE MONEY TO GET MORE.: NEVER TRUE

## 2024-09-24 SDOH — ECONOMIC STABILITY: FOOD INSECURITY: WITHIN THE PAST 12 MONTHS, YOU WORRIED THAT YOUR FOOD WOULD RUN OUT BEFORE YOU GOT MONEY TO BUY MORE.: NEVER TRUE

## 2024-09-24 ASSESSMENT — PATIENT HEALTH QUESTIONNAIRE - PHQ9
1. LITTLE INTEREST OR PLEASURE IN DOING THINGS: NOT AT ALL
2. FEELING DOWN, DEPRESSED OR HOPELESS: NOT AT ALL
SUM OF ALL RESPONSES TO PHQ QUESTIONS 1-9: 0
SUM OF ALL RESPONSES TO PHQ9 QUESTIONS 1 & 2: 0
SUM OF ALL RESPONSES TO PHQ QUESTIONS 1-9: 0

## 2024-09-24 NOTE — PROGRESS NOTES
2024     Rosalba Wilson (:  1955) is a 68 y.o. female, here for evaluation of the following medical concerns:    HPI       Encounter to establish care- patient presented to the clinic to establish care with a new pcp.  The patient's previous pcp is retired.  The patient is feeling well today and denied a new problem.  She has several chronic medical conditions to discuss today.  She denied tobacco use, alcohol use, or drug use.     CVA- in , taking medication as prescribed, her BP is well controlled at this time, she had stenosis of right carotid artery, discharge note stated the following:     Given the concern of the patients presentation and the concern of the possible multi-factorial etiology contributing to patients symptomatology. Patient was admitted and evaluated and found to have:         Discharge Diagnoses:      Acute CVA sec to high-grade right ICA stenosis with subocclusive thrombus: POA  -  CT head wo contrast concerning for acute right parietal stroke. CTA w/ 90% cervical ICA stenosis and intraluminal thrombus.  - Follow up CTA done  after 5 days of heparin with resolution of small thrombus component, but persistence of likely-severe stenosis  -TTE showed normal EF.  Bubble study could not be done  -MRI brain showed patchy infarction of the right parietal lobe and watershed territories of the right frontal lobe and occipital temporal lobe  -A1c 5.9  -Statin, DAPT; o/p f/u  - POD# 1 s/p right carotid stenting for symptomatic carotid stenosis    -PT OT /SLP: DC Home  - O/p F/u with NSGY: plans to call her for telephone checkup ; f/u six months in office with repeat right carotid duplex      Hyperlipidemia  -Continue statin     Hypertension-  - Patient takes lisinopril at home; Bps low-satisfactory. Held. Re-eval o/p with PCP follow up.      Hyperkalemia: medically treated. K 4.5 today  O/p F/u  -Mild monitor.      Eyesight is correctable with glasses    Perforated

## 2024-09-30 PROBLEM — I77.6 VASCULITIS (HCC): Status: ACTIVE | Noted: 2024-09-30

## 2024-09-30 ASSESSMENT — ENCOUNTER SYMPTOMS
ABDOMINAL PAIN: 0
WHEEZING: 0
FACIAL SWELLING: 0
SHORTNESS OF BREATH: 0
NAUSEA: 0
SINUS PRESSURE: 0
VOMITING: 0
TROUBLE SWALLOWING: 0
COUGH: 0
SORE THROAT: 0
CHEST TIGHTNESS: 0
RHINORRHEA: 0
DIARRHEA: 0

## 2025-04-23 RX ORDER — ATORVASTATIN CALCIUM 80 MG/1
80 TABLET, FILM COATED ORAL NIGHTLY
Qty: 90 TABLET | Refills: 1 | Status: SHIPPED | OUTPATIENT
Start: 2025-04-23 | End: 2025-04-24 | Stop reason: SDUPTHER

## 2025-04-23 NOTE — TELEPHONE ENCOUNTER
Last office visit 9/24/2024      Next office visit scheduled Visit date not found    Requested Prescriptions     Pending Prescriptions Disp Refills    atorvastatin (LIPITOR) 80 MG tablet [Pharmacy Med Name: ATORVASTATIN 80 MG TABLET] 90 tablet 1     Sig: TAKE ONE TABLET BY MOUTH ONCE NIGHTLY

## 2025-04-24 ENCOUNTER — OFFICE VISIT (OUTPATIENT)
Dept: PRIMARY CARE CLINIC | Age: 70
End: 2025-04-24
Payer: COMMERCIAL

## 2025-04-24 VITALS
OXYGEN SATURATION: 98 % | TEMPERATURE: 96.2 F | HEART RATE: 59 BPM | BODY MASS INDEX: 31.79 KG/M2 | RESPIRATION RATE: 18 BRPM | SYSTOLIC BLOOD PRESSURE: 100 MMHG | WEIGHT: 190.8 LBS | DIASTOLIC BLOOD PRESSURE: 72 MMHG | HEIGHT: 65 IN

## 2025-04-24 DIAGNOSIS — Z13.220 SCREENING FOR HYPERLIPIDEMIA: Primary | ICD-10-CM

## 2025-04-24 DIAGNOSIS — I63.411 CEREBROVASCULAR ACCIDENT (CVA) DUE TO EMBOLISM OF RIGHT MIDDLE CEREBRAL ARTERY (HCC): ICD-10-CM

## 2025-04-24 DIAGNOSIS — I10 PRIMARY HYPERTENSION: ICD-10-CM

## 2025-04-24 DIAGNOSIS — R51.9 PRESSURE IN HEAD: ICD-10-CM

## 2025-04-24 LAB
ALBUMIN SERPL-MCNC: 4 G/DL (ref 3.4–5)
ALBUMIN/GLOB SERPL: 1.3 {RATIO} (ref 1.1–2.2)
ALP SERPL-CCNC: 78 U/L (ref 40–129)
ALT SERPL-CCNC: 17 U/L (ref 10–40)
ANION GAP SERPL CALCULATED.3IONS-SCNC: 12 MMOL/L (ref 3–16)
AST SERPL-CCNC: 22 U/L (ref 15–37)
BASOPHILS # BLD: 0.1 K/UL (ref 0–0.2)
BASOPHILS NFR BLD: 1.2 %
BILIRUB SERPL-MCNC: 0.4 MG/DL (ref 0–1)
BUN SERPL-MCNC: 18 MG/DL (ref 7–20)
CALCIUM SERPL-MCNC: 9.3 MG/DL (ref 8.3–10.6)
CHLORIDE SERPL-SCNC: 107 MMOL/L (ref 99–110)
CHOLEST SERPL-MCNC: 153 MG/DL (ref 0–199)
CO2 SERPL-SCNC: 21 MMOL/L (ref 21–32)
CREAT SERPL-MCNC: 1.1 MG/DL (ref 0.6–1.2)
DEPRECATED RDW RBC AUTO: 16.3 % (ref 12.4–15.4)
EOSINOPHIL # BLD: 0.3 K/UL (ref 0–0.6)
EOSINOPHIL NFR BLD: 4.4 %
GFR SERPLBLD CREATININE-BSD FMLA CKD-EPI: 55 ML/MIN/{1.73_M2}
GLUCOSE SERPL-MCNC: 112 MG/DL (ref 70–99)
HCT VFR BLD AUTO: 39.9 % (ref 36–48)
HDLC SERPL-MCNC: 43 MG/DL (ref 40–60)
HGB BLD-MCNC: 12.8 G/DL (ref 12–16)
LDLC SERPL CALC-MCNC: 84 MG/DL
LYMPHOCYTES # BLD: 1.8 K/UL (ref 1–5.1)
LYMPHOCYTES NFR BLD: 29.7 %
MCH RBC QN AUTO: 28.4 PG (ref 26–34)
MCHC RBC AUTO-ENTMCNC: 32 G/DL (ref 31–36)
MCV RBC AUTO: 88.6 FL (ref 80–100)
MONOCYTES # BLD: 0.6 K/UL (ref 0–1.3)
MONOCYTES NFR BLD: 9.5 %
NEUTROPHILS # BLD: 3.4 K/UL (ref 1.7–7.7)
NEUTROPHILS NFR BLD: 55.2 %
PLATELET # BLD AUTO: 209 K/UL (ref 135–450)
PMV BLD AUTO: 10.3 FL (ref 5–10.5)
POTASSIUM SERPL-SCNC: 4.6 MMOL/L (ref 3.5–5.1)
PROT SERPL-MCNC: 7.2 G/DL (ref 6.4–8.2)
RBC # BLD AUTO: 4.5 M/UL (ref 4–5.2)
SODIUM SERPL-SCNC: 140 MMOL/L (ref 136–145)
TRIGL SERPL-MCNC: 131 MG/DL (ref 0–150)
VLDLC SERPL CALC-MCNC: 26 MG/DL
WBC # BLD AUTO: 6.1 K/UL (ref 4–11)

## 2025-04-24 PROCEDURE — 1123F ACP DISCUSS/DSCN MKR DOCD: CPT | Performed by: FAMILY MEDICINE

## 2025-04-24 PROCEDURE — 1159F MED LIST DOCD IN RCRD: CPT | Performed by: FAMILY MEDICINE

## 2025-04-24 PROCEDURE — 99214 OFFICE O/P EST MOD 30 MIN: CPT | Performed by: FAMILY MEDICINE

## 2025-04-24 PROCEDURE — 1160F RVW MEDS BY RX/DR IN RCRD: CPT | Performed by: FAMILY MEDICINE

## 2025-04-24 PROCEDURE — G2211 COMPLEX E/M VISIT ADD ON: HCPCS | Performed by: FAMILY MEDICINE

## 2025-04-24 PROCEDURE — 3074F SYST BP LT 130 MM HG: CPT | Performed by: FAMILY MEDICINE

## 2025-04-24 PROCEDURE — 3078F DIAST BP <80 MM HG: CPT | Performed by: FAMILY MEDICINE

## 2025-04-24 RX ORDER — ASPIRIN 81 MG/1
81 TABLET ORAL DAILY
Qty: 90 TABLET | Refills: 1 | Status: SHIPPED | OUTPATIENT
Start: 2025-04-24

## 2025-04-24 RX ORDER — LISINOPRIL 40 MG/1
40 TABLET ORAL DAILY
Qty: 90 TABLET | Refills: 2 | Status: SHIPPED | OUTPATIENT
Start: 2025-04-24

## 2025-04-24 RX ORDER — ATORVASTATIN CALCIUM 80 MG/1
80 TABLET, FILM COATED ORAL NIGHTLY
Qty: 90 TABLET | Refills: 1 | Status: SHIPPED | OUTPATIENT
Start: 2025-04-24

## 2025-04-24 RX ORDER — FLUTICASONE PROPIONATE 50 MCG
1 SPRAY, SUSPENSION (ML) NASAL DAILY
Qty: 32 G | Refills: 1 | Status: SHIPPED | OUTPATIENT
Start: 2025-04-24

## 2025-04-24 SDOH — ECONOMIC STABILITY: FOOD INSECURITY: WITHIN THE PAST 12 MONTHS, THE FOOD YOU BOUGHT JUST DIDN'T LAST AND YOU DIDN'T HAVE MONEY TO GET MORE.: NEVER TRUE

## 2025-04-24 SDOH — ECONOMIC STABILITY: FOOD INSECURITY: WITHIN THE PAST 12 MONTHS, YOU WORRIED THAT YOUR FOOD WOULD RUN OUT BEFORE YOU GOT MONEY TO BUY MORE.: NEVER TRUE

## 2025-04-24 ASSESSMENT — PATIENT HEALTH QUESTIONNAIRE - PHQ9
2. FEELING DOWN, DEPRESSED OR HOPELESS: NOT AT ALL
SUM OF ALL RESPONSES TO PHQ QUESTIONS 1-9: 0
1. LITTLE INTEREST OR PLEASURE IN DOING THINGS: NOT AT ALL
SUM OF ALL RESPONSES TO PHQ QUESTIONS 1-9: 0

## 2025-04-24 NOTE — PROGRESS NOTES
2025     Rosalba Wilson (:  1955) is a 69 y.o. female, here for evaluation of the following medical concerns:    HPI    Patient presented today for follow-up concerning several issues.  She has ongoing headache and wanted to discuss the implications of this.    Pressure on top of head, has had allergies recently, concerned due to having a CVA in the past.  She denied unilateral weakness, facial droop, or slurred speech at this time.  Her main complaint is head pressure.    CVA- in , taking medication as prescribed, her BP is well controlled at this time, she had stenosis of right carotid artery, discharge note stated the following:    Hypertension-patient is taking lisinopril 40 mg a day and tolerating medication well.  Her blood pressure today is 100/72 and she denies vision change, traditional headache, or dizziness at this time.     Perforated eardrum- left very little hearing,     Today she denied chest pain, shortness of breath, nausea, vomiting, diarrhea.    Review of Systems   Constitutional:  Positive for fatigue. Negative for activity change, fever and unexpected weight change.   HENT:  Negative for congestion, ear pain, facial swelling, rhinorrhea, sinus pressure, sore throat and trouble swallowing.    Eyes:  Negative for visual disturbance.   Respiratory:  Negative for cough and shortness of breath.    Cardiovascular:  Negative for chest pain and leg swelling.   Gastrointestinal:  Negative for abdominal pain, diarrhea, nausea and vomiting.   Musculoskeletal:  Negative for arthralgias.   Skin:  Negative for rash.   Neurological:  Positive for headaches. Negative for dizziness, tremors, seizures, syncope, facial asymmetry, speech difficulty, light-headedness and numbness.   Psychiatric/Behavioral:  Negative for dysphoric mood. The patient is not nervous/anxious.        Prior to Visit Medications    Medication Sig Taking? Authorizing Provider   lisinopril (PRINIVIL;ZESTRIL) 40

## 2025-04-25 ENCOUNTER — RESULTS FOLLOW-UP (OUTPATIENT)
Dept: PRIMARY CARE CLINIC | Age: 70
End: 2025-04-25

## 2025-04-28 PROBLEM — I10 PRIMARY HYPERTENSION: Status: ACTIVE | Noted: 2025-04-28

## 2025-04-28 ASSESSMENT — ENCOUNTER SYMPTOMS
TROUBLE SWALLOWING: 0
COUGH: 0
DIARRHEA: 0
SINUS PRESSURE: 0
SORE THROAT: 0
FACIAL SWELLING: 0
RHINORRHEA: 0
SHORTNESS OF BREATH: 0
NAUSEA: 0
VOMITING: 0
ABDOMINAL PAIN: 0